# Patient Record
Sex: FEMALE | Race: WHITE
[De-identification: names, ages, dates, MRNs, and addresses within clinical notes are randomized per-mention and may not be internally consistent; named-entity substitution may affect disease eponyms.]

---

## 2018-08-30 ENCOUNTER — HOSPITAL ENCOUNTER (EMERGENCY)
Dept: HOSPITAL 97 - ER | Age: 53
Discharge: HOME | End: 2018-08-30
Payer: COMMERCIAL

## 2018-08-30 DIAGNOSIS — N93.9: Primary | ICD-10-CM

## 2018-08-30 DIAGNOSIS — I10: ICD-10-CM

## 2018-08-30 DIAGNOSIS — Z72.0: ICD-10-CM

## 2018-08-30 LAB
BUN BLD-MCNC: 19 MG/DL (ref 7–18)
GLUCOSE SERPLBLD-MCNC: 90 MG/DL (ref 74–106)
HCT VFR BLD CALC: 39.9 % (ref 36–45)
LYMPHOCYTES # SPEC AUTO: 3.1 K/UL (ref 0.7–4.9)
MCH RBC QN AUTO: 33.2 PG (ref 27–35)
MCV RBC: 95.9 FL (ref 80–100)
MORPHOLOGY BLD-IMP: (no result)
PMV BLD: 8.7 FL (ref 7.6–11.3)
POTASSIUM SERPL-SCNC: 4 MMOL/L (ref 3.5–5.1)
RBC # BLD: 4.16 M/UL (ref 3.86–4.86)

## 2018-08-30 PROCEDURE — 76830 TRANSVAGINAL US NON-OB: CPT

## 2018-08-30 PROCEDURE — 81003 URINALYSIS AUTO W/O SCOPE: CPT

## 2018-08-30 PROCEDURE — 86901 BLOOD TYPING SEROLOGIC RH(D): CPT

## 2018-08-30 PROCEDURE — 85025 COMPLETE CBC W/AUTO DIFF WBC: CPT

## 2018-08-30 PROCEDURE — 80048 BASIC METABOLIC PNL TOTAL CA: CPT

## 2018-08-30 PROCEDURE — 36415 COLL VENOUS BLD VENIPUNCTURE: CPT

## 2018-08-30 PROCEDURE — 99284 EMERGENCY DEPT VISIT MOD MDM: CPT

## 2018-08-30 PROCEDURE — 86900 BLOOD TYPING SEROLOGIC ABO: CPT

## 2018-08-30 NOTE — ER
Nurse's Notes                                                                                     

 [facility]                                                                

Name: Moriah Pan                                                                               

Age: 52 yrs                                                                                       

Sex: Female                                                                                       

: 1965                                                                                   

MRN: [id number]                                                                                   

Arrival Date: 2018                                                                          

Time: 05:36                                                                                       

Account#: [id number]                                                                            

Bed 5                                                                                             

Private MD: Agustín Slade                                                                         

Diagnosis: Abnormal uterine and vaginal bleeding, unspecified                                     

                                                                                                  

Presentation:                                                                                     

                                                                                             

05:42 Presenting complaint: Patient states: HEAVY VAGINAL BLEEDING FOR 24 HOURS. Transition   bp  

      of care: patient was not received from another setting of care. Onset of symptoms was       

      2018 at 05:42. Risk Assessment: Do you want to hurt yourself or someone          

      else? Patient reports no desire to harm self or others. Initial Sepsis Screen: Does the     

      patient meet any 2 criteria? No. Patient's initial sepsis screen is negative. Does the      

      patient have a suspected source of infection? No. Patient's initial sepsis screen is        

      negative. Care prior to arrival: None.                                                      

05:42 Method Of Arrival: Ambulatory                                                           bp  

05:42 Acuity: TEA 3                                                                           bp  

                                                                                                  

Triage Assessment:                                                                                

05:43 General: Appears in no apparent distress. comfortable, obese, Behavior is calm,         bp  

      cooperative, appropriate for age. Pain: Denies pain. EENT: No deficits noted. Neuro:        

      Level of Consciousness is awake, alert, obeys commands, Oriented to person, place,          

      time, situation, Appropriate for age. Cardiovascular: No deficits noted. Respiratory:       

      Airway is patent Respiratory effort is even, unlabored, Respiratory pattern is regular,     

      symmetrical. GI: No signs and/or symptoms were reported involving the gastrointestinal      

      system. : Reports vaginal bleeding that is heavy flow. Derm: No deficits noted.           

      Musculoskeletal: Circulation, motion, and sensation intact. Range of motion:.               

                                                                                                  

OB/GYN:                                                                                           

05:45 LMP N/A - Post-menopause                                                                bp  

06:17  4,  1, Living 3, LMP 2016                                             kb  

                                                                                                  

Historical:                                                                                       

- Allergies:                                                                                      

05:43 No Known Allergies;                                                                     bp  

- Home Meds:                                                                                      

05:43 None [Active];                                                                          bp  

- PMHx:                                                                                           

05:43 Hypertension; Hepatitis; resolved ; ITP;                                            bp  

                                                                                                  

- Immunization history:: Adult Immunizations up to date.                                          

- Social history:: Smoking status: Patient uses tobacco products, denies chronic                  

  smoking, but will smoke occasionally.                                                           

- Ebola Screening: : Patient negative for fever greater than or equal to 101.5 degrees            

  Fahrenheit, and additional compatible Ebola Virus Disease symptoms Patient denies               

  exposure to infectious person Patient denies travel to an Ebola-affected area in the            

  21 days before illness onset No symptoms or risks identified at this time.                      

                                                                                                  

                                                                                                  

Screenin:47 Abuse screen: Denies threats or abuse. Denies injuries from another. Nutritional        bp  

      screening: No deficits noted. Tuberculosis screening: No symptoms or risk factors           

      identified. Fall Risk None identified. No fall in past 12 months (0 pts). No secondary      

      diagnosis (0 pts). No IV (0 pts). Ambulatory Aid- None/Bed Rest/Nurse Assist (0 pts).       

      Gait- Normal/Bed Rest/Wheelchair (0 pts) Mental Status- Oriented to own ability (0          

      pts). Total Beal Fall Scale indicates No Risk (0-24 pts).                                  

                                                                                                  

Assessment:                                                                                       

05:47 General: SEE TRIAGE NOTE.                                                               bp  

06:45 Reassessment: U/S AT B/S.                                                               bp  

07:15 Reassessment: Patient appears in no apparent distress at this time. Patient and/or      cc3 

      family updated on plan of care and expected duration. Pain level reassessed. Patient is     

      alert, oriented x 3, equal unlabored respirations, skin warm/dry/pink. Patient denies       

      pain at this time.                                                                          

                                                                                                  

Vital Signs:                                                                                      

05:45  / 78; Pulse 66; Resp 14; Temp 98.4; Pulse Ox 97% ; Weight 87.54 kg; Height 5 ft. bp  

      2 in. (157.48 cm);                                                                          

06:00  / 72; Pulse 69; Resp 14; Pulse Ox 97% ;                                          bp  

06:15  / 77 Supine; Pulse 60;                                                           bp  

06:17  / 82 Sitting; Pulse 65;                                                          bp  

06:19  / 85 Standing; Pulse 66;                                                         bp  

07:15  / 67; Pulse 65; Resp 16; Temp 98.4; Pulse Ox 96% on R/A; Pain 0/10;              cc3 

08:20  / 78; Pulse 66; Resp 16; Pulse Ox 97% on R/A; Pain 0/10;                         hb  

05:45 Body Mass Index 35.30 (87.54 kg, 157.48 cm)                                             bp  

                                                                                                  

ED Course:                                                                                        

05:36 Patient arrived in ED.                                                                  al2 

05:37 Agustín Slade MD is Private Physician.                                                 al2 

05:41 Agustín Cain RN is Primary Nurse.                                                    bp  

05:43 Triage completed.                                                                       bp  

05:45 Arm band placed on right wrist.                                                         bp  

05:47 Patient has correct armband on for positive identification. Bed in low position. Call   bp  

      light in reach. Side rails up X 1. Side rails up X2.                                        

05:50 Nikita Wong MD is Attending Physician.                                            tw4 

06:03 [facility]                                                        kb  

06:03 Nikiat Wong MD is Attending Physician.                                            kb  

06:34 Inserted saline lock: 20 gauge in right antecubital area, using aseptic technique.      bp  

      Blood collected.                                                                            

07:00 Ultrasound completed.                                                                   aa4 

07:00 Transvaginal Study Probe In Process Unspecified.                                        EDMS

07:06 Report given to Tracey BOONE.                                                             jd3 

07:45 Manual Differential Sent.                                                               sv  

08:30 No provider procedures requiring assistance completed. IV discontinued, intact,         hb  

      bleeding controlled, No redness/swelling at site. Pressure dressing applied.                

                                                                                                  

Administered Medications:                                                                         

No medications were administered                                                                  

                                                                                                  

                                                                                                  

Outcome:                                                                                          

08:19 Discharge ordered by MD.                                                                kb  

08:30 Discharged to home ambulatory, with family.                                             hb  

08:30 Condition: stable                                                                           

08:30 Discharge instructions given to patient, family, Instructed on discharge instructions,      

      follow up and referral plans. Demonstrated understanding of instructions, follow-up         

      care.                                                                                       

08:34 Patient left the ED.                                                                    hb  

                                                                                                  

Signatures:                                                                                       

Dispatcher [employer]                           EDMS                                                 

Annia Francois FNP-C FNP-Ckb Verde, Stephanie, RN RN sv Frazier, Amanda                              aa4                                                  

Tracey Potter, Devin Paez RN, RN RN jd3 Peltier, Brian, RN RN bp Love, Angelica al2 Wadley, Terrence, MD MD   tw4                                                  

Justyna Peralta                             3                                                  

                                                                                                  

**************************************************************************************************

## 2018-08-30 NOTE — EDPHYS
Physician Documentation                                                                           

 [facility]                                                                

Name: Moriah Pan                                                                               

Age: 52 yrs                                                                                       

Sex: Female                                                                                       

: 1965                                                                                   

MRN: [id number]                                                                                   

Arrival Date: 2018                                                                          

Time: 05:36                                                                                       

Account#: [id number]                                                                            

Bed 5                                                                                             

Private MD: Agustín Slade                                                                         

ED Physician Nikita Wong                                                                     

HPI:                                                                                              

                                                                                             

06:17 This 52 yrs old  Female presents to ER via Ambulatory with complaints of       kb  

      Vaginal Bleeding.                                                                           

06:17 The patient presents with vaginal bleeding that is moderate, reports using 6 pads or    kb  

      tampons per day. Onset: The symptoms/episode began/occurred 24 hour(s) ago. Modifying       

      factors: The symptoms are alleviated by nothing, the symptoms are aggravated by             

      nothing. Associated signs and symptoms: Pertinent positives: vaginal bleeding,              

      Pertinent negatives: cramping, fever. Severity of symptoms: At their worst the symptoms     

      were moderate, in the emergency department the symptoms are unchanged. The patient has      

      not experienced similar symptoms in the past. The patient has not recently seen a           

      physician. Pt states she hasn't had a period since 2016 and started having vaginal     

      bleeding 24 hours ago. Has used 6 tampons since onset. Denies abd pain, cramping.           

                                                                                                  

OB/GYN:                                                                                           

05:45 LMP N/A - Post-menopause                                                                bp  

06:17  4,  1, Living 3, LMP 2016                                             kb  

                                                                                                  

Historical:                                                                                       

- Allergies:                                                                                      

05:43 No Known Allergies;                                                                     bp  

- Home Meds:                                                                                      

05:43 None [Active];                                                                          bp  

- PMHx:                                                                                           

05:43 Hypertension; Hepatitis; resolved ; ITP;                                            bp  

                                                                                                  

- Immunization history:: Adult Immunizations up to date.                                          

- Social history:: Smoking status: Patient uses tobacco products, denies chronic                  

  smoking, but will smoke occasionally.                                                           

- Ebola Screening: : Patient negative for fever greater than or equal to 101.5 degrees            

  Fahrenheit, and additional compatible Ebola Virus Disease symptoms Patient denies               

  exposure to infectious person Patient denies travel to an Ebola-affected area in the            

  21 days before illness onset No symptoms or risks identified at this time.                      

                                                                                                  

                                                                                                  

ROS:                                                                                              

06:17 Constitutional: Negative for fever, chills, and weight loss, Cardiovascular: Negative   kb  

      for chest pain, palpitations, and edema, Respiratory: Negative for shortness of breath,     

      cough, wheezing, and pleuritic chest pain, Abdomen/GI: Negative for abdominal pain,         

      nausea, vomiting, diarrhea, and constipation, MS/Extremity: Negative for injury and         

      deformity, Skin: Negative for injury, rash, and discoloration, Neuro: Negative for          

      headache, weakness, numbness, tingling, and seizure.                                        

06:17 : Positive for vaginal bleeding.                                                          

                                                                                                  

Exam:                                                                                             

06:17 Constitutional:  This is a well developed, well nourished patient who is awake, alert,  kb  

      and in no acute distress. Head/Face:  Normocephalic, atraumatic. Chest/axilla:  Normal      

      chest wall appearance and motion.  Nontender with no deformity.  No lesions are             

      appreciated. Cardiovascular:  Regular rate and rhythm with a normal S1 and S2.  No          

      gallops, murmurs, or rubs.  Normal PMI, no JVD.  No pulse deficits. Respiratory:  Lungs     

      have equal breath sounds bilaterally, clear to auscultation and percussion.  No rales,      

      rhonchi or wheezes noted.  No increased work of breathing, no retractions or nasal          

      flaring. Abdomen/GI:  Soft, non-tender, with normal bowel sounds.  No distension or         

      tympany.  No guarding or rebound.  No evidence of tenderness throughout. Back:  No          

      spinal tenderness.  No costovertebral tenderness.  Full range of motion. Skin:  Warm,       

      dry with normal turgor.  Normal color with no rashes, no lesions, and no evidence of        

      cellulitis. MS/ Extremity:  Pulses equal, no cyanosis.  Neurovascular intact.  Full,        

      normal range of motion. Neuro:  Awake and alert, GCS 15, oriented to person, place,         

      time, and situation.  Cranial nerves II-XII grossly intact.  Motor strength 5/5 in all      

      extremities.  Sensory grossly intact.  Cerebellar exam normal.  Normal gait.                

                                                                                                  

Vital Signs:                                                                                      

05:45  / 78; Pulse 66; Resp 14; Temp 98.4; Pulse Ox 97% ; Weight 87.54 kg; Height 5 ft. bp  

      2 in. (157.48 cm);                                                                          

06:00  / 72; Pulse 69; Resp 14; Pulse Ox 97% ;                                          bp  

06:15  / 77 Supine; Pulse 60;                                                           bp  

06:17  / 82 Sitting; Pulse 65;                                                          bp  

06:19  / 85 Standing; Pulse 66;                                                         bp  

07:15  / 67; Pulse 65; Resp 16; Temp 98.4; Pulse Ox 96% on R/A; Pain 0/10;              cc3 

08:20  / 78; Pulse 66; Resp 16; Pulse Ox 97% on R/A; Pain 0/10;                         hb  

05:45 Body Mass Index 35.30 (87.54 kg, 157.48 cm)                                             bp  

                                                                                                  

MDM:                                                                                              

05:50 Patient medically screened.                                                             tw4 

06:17 Data reviewed: vital signs, nurses notes. Data interpreted: Pulse oximetry: on room air kb  

      is 97 %. Interpretation: normal.                                                            

08:19 Counseling: I had a detailed discussion with the patient and/or guardian regarding: the kb  

      historical points, exam findings, and any diagnostic results supporting the                 

      discharge/admit diagnosis, lab results, radiology results, the need for outpatient          

      follow up, an OB/Gyne specialist, to return to the emergency department if symptoms         

      worsen or persist or if there are any questions or concerns that arise at home.             

                                                                                                  

                                                                                             

06:05 Order name: Abo/rh Typing                                                               kb  

                                                                                             

06:05 Order name: Basic Metabolic Panel; Complete Time: 06:57                                 kb  

                                                                                             

06:05 Order name: CBC with Diff                                                               kb  

                                                                                             

06:43 Order name: Urine Dipstick--Ancillary (enter results)                                   eb  

                                                                                             

06:48 Order name: Manual Differential                                                         EDMS

                                                                                             

06:05 Order name: IV Saline Lock; Complete Time: 06:33                                        kb  

                                                                                             

06:05 Order name: Labs collected and sent; Complete Time: 06:33                               kb  

                                                                                             

06:05 Order name: NPO; Complete Time: 06:33                                                   kb  

                                                                                             

06:05 Order name: Urine Dipstick-Ancillary (obtain specimen); Complete Time: 06:33            kb  

                                                                                             

06:05 Order name: Orthostatics; Complete Time: 06:33                                          kb  

                                                                                             

06:41 Order name: Transvaginal Study Probe                                                    EDMS

                                                                                                  

Administered Medications:                                                                         

No medications were administered                                                                  

                                                                                                  

                                                                                                  

Disposition:                                                                                      

18 08:19 Discharged to Home. Impression: Abnormal uterine and vaginal bleeding,             

  unspecified.                                                                                    

- Condition is Stable.                                                                            

- Discharge Instructions: Abnormal Uterine Bleeding, Easy-to-Read.                                

                                                                                                  

- Medication Reconciliation Form, Thank You Letter, Antibiotic Education, Prescription            

  Opioid Use form.                                                                                

- Follow up: Emergency Department; When: As needed; Reason: Worsening of condition.               

  Follow up: Private Physician; When: 2 - 3 days; Reason: Recheck today's complaints,             

  Continuance of care, Re-evaluation by your physician.                                           

                                                                                                  

                                                                                                  

                                                                                                  

Addendum:                                                                                         

2018                                                                                        

     05:12 Co-signature as Attending Physician, Nikita Wong MD I agree with the assessment and t
w4

           plan of care. Attestation: The patient's history, exam findings, diagnostics, and a    

           summary of any interventions or procedures was reviewed in detail with Annia ZULETA.                                                                                 

                                                                                                  

Signatures:                                                                                       

Dispatcher [employer]                           [facility]                                                 

Annia Francois FNP-C FNP-CkTracey Fuentes, RN                     RN   Agustín Quintero, RN                      RN   bp                                                   

Nikita Wong MD MD   tw4                                                  

                                                                                                  

Corrections: (The following items were deleted from the chart)                                    

                                                                                             

06:41 06:30 Transvaginal Ob+US.RAD.BRZ ordered. [facility]

08:34 08:19 2018 08:19 Discharged to Home. Impression: Abnormal uterine and vaginal     hb  

      bleeding, unspecified. Condition is Stable. Forms are Medication Reconciliation Form,       

      Thank You Letter, Antibiotic Education, Prescription Opioid Use. Follow up: Emergency       

      Department; When: As needed; Reason: Worsening of condition. Follow up: Private             

      Physician; When: 2 - 3 days; Reason: Recheck today's complaints, Continuance of care,       

      Re-evaluation by your physician. kb                                                         

                                                                                                  

**************************************************************************************************

## 2018-08-30 NOTE — RAD REPORT
EXAM DESCRIPTION:  US - Transvaginal Study Probe - 8/30/2018 8:11 am

 

CLINICAL HISTORY:  Postmenopausal bleeding

 

COMPARISON:  None

 

FINDINGS:  The uterus measures 7 x 5 x 5cm. The uterus is retroverted.

 

Endometrial stripe measures 15 millimeters.

 

A fibroid is not seen.

 

No free fluid is seen

 

IMPRESSION:  Thickened endometrial stripe may be secondary to endometrial carcinoma, polyp or hyperpl
krishna

## 2018-11-01 ENCOUNTER — HOSPITAL ENCOUNTER (OUTPATIENT)
Dept: HOSPITAL 97 - OR | Age: 53
Discharge: HOME | End: 2018-11-01
Attending: OBSTETRICS & GYNECOLOGY
Payer: COMMERCIAL

## 2018-11-01 DIAGNOSIS — N88.2: ICD-10-CM

## 2018-11-01 DIAGNOSIS — Z80.51: ICD-10-CM

## 2018-11-01 DIAGNOSIS — Z80.52: ICD-10-CM

## 2018-11-01 DIAGNOSIS — Z86.19: ICD-10-CM

## 2018-11-01 DIAGNOSIS — N95.0: Primary | ICD-10-CM

## 2018-11-01 PROCEDURE — 0UDB8ZX EXTRACTION OF ENDOMETRIUM, VIA NATURAL OR ARTIFICIAL OPENING ENDOSCOPIC, DIAGNOSTIC: ICD-10-PCS

## 2018-11-01 PROCEDURE — 88305 TISSUE EXAM BY PATHOLOGIST: CPT

## 2018-11-01 NOTE — OP
Date of Procedure:  11/01/2018



Surgeon:  Rachel Padgett MD



Assistant:  None.



Preoperative Diagnosis:  Postmenopausal bleeding.  cervical stenosis.



Postoperative Diagnosis:  Cervical stenosis.



Procedures Performed:  Hysteroscopy, dilation and curettage with Symphion.  The patient with cervical
 stenosis.



Specimens:  Endometrial curettings.



Complications:  None.



Drains:  None.



Findings:  The cervix had to be dilated in order for me to introduce a Symphion scope.  The cavity wa
s empty.  No intracavitary lesions.  Both tubal ostia were visualized.  The cavity was undistorted.  
Optimal curettings were obtained.



Condition:  The patient is stable.



Indications For Procedure:  The patient is a 53-year-old with postmenopausal bleeding.  Hysteroscopy 
was performed in the office and attempted to do curettings.  There was difficulty dilating her cervix
 since she was unable to tolerate the pain so we stopped at that point and brought to the hospital fo
r the procedure under anesthesia.



Description Of Procedure:  After informed consent was verified, the patient was taken back to the OR,
 placed in a supine fashion on the operating table.  After general anesthesia was given, she was plac
ed in a dorsal lithotomy position.  Pelvic exam was performed.  Uterus was found to be anteflexed.  N
o adnexal masses. 



After vaginal speculum was placed, prep x3 with Betadine was done.  Anterior lip grasped with 2 Allis
 clamps.  Cervix was dilated to 18-Occitan.  Then, the Symphion scope was primed and inserted through 
the cervical canal under direct vision into the uterine cavity.  The cavity was empty.  Then, the res
ection device was taken and used to resect the endometrium in a global fashion.  There was adequate s
ampling.  The pressure set was at 100 mmHg.  EBL was minimal.  All the instruments were removed after
 the procedure was completed.  The fluid deficit was minimal.  Normal saline was used for distention 
medium. 



After the patient was recovered from anesthesia in the OR, she was taken to PACU in stable condition.
  She will follow up with me in 1 week for results.





SK/JOHNNY

DD:  11/01/2018 07:49:30Voice ID:  [id number]

DT:  11/01/2018 12:20:47Report ID:  [id number]

## 2020-10-07 ENCOUNTER — HOSPITAL ENCOUNTER (EMERGENCY)
Dept: HOSPITAL 97 - ER | Age: 55
Discharge: HOME | End: 2020-10-07
Payer: COMMERCIAL

## 2020-10-07 VITALS — OXYGEN SATURATION: 98 % | DIASTOLIC BLOOD PRESSURE: 71 MMHG | SYSTOLIC BLOOD PRESSURE: 131 MMHG

## 2020-10-07 VITALS — TEMPERATURE: 97.2 F
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
## 2020-10-07 DIAGNOSIS — I10: ICD-10-CM
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
## 2020-10-07 DIAGNOSIS — S82.142A: Primary | ICD-10-CM

## 2020-10-07 PROCEDURE — 99283 EMERGENCY DEPT VISIT LOW MDM: CPT

## 2020-10-07 PROCEDURE — 73700 CT LOWER EXTREMITY W/O DYE: CPT

## 2020-10-07 NOTE — RAD REPORT
EXAM DESCRIPTION:  CT - Knee Left Wo Con - 10/7/2020 8:23 pm

 

CLINICAL HISTORY:  knee injury

Trauma, knee pain injury

 

COMPARISON:  No comparisons

 

FINDINGS:  Mildly depressed tibial plateau fracture is present measuring 18 mm in transverse dimensio
n. Articular surface of the lateral tibial plateau is depressed approximately 5 mm. A moderate lipohe
marthrosis is present. No additional fracture evident.

 

IMPRESSION:  Mildly depressed lateral tibial plateau fracture is noted.

 

 All CT scans are performed using dose optimization technique as appropriate and may include

automated exposure control or mA/KV adjustment according to patient size.

## 2020-10-07 NOTE — RAD REPORT
EXAM DESCRIPTION:  RAD - Knee Left 3 View - 10/7/2020 6:57 pm

 

CLINICAL HISTORY:  swing struck lateral side of knee;Pain

Pain and swelling

 

COMPARISON:  No comparisons

 

FINDINGS:  Intraarticular fracture of the lateral aspect of the tibial plateau is suspected with mode
rate lipohemarthrosis.

## 2020-10-07 NOTE — ER
Nurse's Notes                                                                                     

 [facility]                                                                 

Name: Moriah Pan                                                                               

Age: 55 yrs                                                                                       

Sex: Female                                                                                       

: 1965                                                                                   

MRN: [id number]                                                                                   

Arrival Date: 10/07/2020                                                                          

Time: 17:56                                                                                       

Account#: [id number]                                                                            

Bed 13                                                                                            

Private MD: Agustín Slade                                                                         

Diagnosis: Left Knee Lateral Tibial Plateau Fracture;Possible Left Quadriceps Tendon Rupture      

                                                                                                  

Presentation:                                                                                     

10/07                                                                                             

18:11 Chief complaint: Patient states: 40-inch swing with 2 boys on it hit side of my L knee  ca1 

      and I felt like it moved sideways. I can't move it and I was in a lot of pain. Took 4       

      Tylenol with relief. Coronavirus screen: Client denies travel out of the [place] in the        

      last 14 days. At this time, the client does not indicate any symptoms associated with       

      coronavirus-19. Ebola Screen: Patient negative for fever greater than or equal to 101.5     

      degrees Fahrenheit, and additional compatible Ebola Virus Disease symptoms Patient          

      denies exposure to infectious person. Patient denies travel to an Ebola-affected area       

      in the 21 days before illness onset. No symptoms or risks identified at this time.          

      Initial Sepsis Screen: Does the patient meet any 2 criteria? No. Patient's initial          

      sepsis screen is negative. Does the patient have a suspected source of infection? No.       

      Patient's initial sepsis screen is negative. Risk Assessment: Do you want to hurt           

      yourself or someone else? Patient reports no desire to harm self or others. Onset of        

      symptoms was 2020.                                                              

18:11 Method Of Arrival: Wheelchair                                                           ca1 

18:11 Acuity: TEA 4                                                                           ca1 

                                                                                                  

OB/GYN:                                                                                           

18:14 LMP N/A - Post-menopause                                                                ca1 

                                                                                                  

Historical:                                                                                       

- Allergies:                                                                                      

18:14 No Known Allergies;                                                                     ca1 

- PMHx:                                                                                           

18:16 Hepatitis; resolved ; Hypertension; ITP;                                            ca1 

                                                                                                  

- Immunization history:: Adult Immunizations up to date.                                          

- Social history:: Smoking status: Patient denies any tobacco usage or history of.                

                                                                                                  

                                                                                                  

Screenin:13 Abuse screen: Denies threats or abuse. Nutritional screening: No deficits noted.        tw2 

      Tuberculosis screening: No symptoms or risk factors identified. Fall Risk None              

      identified.                                                                                 

                                                                                                  

Assessment:                                                                                       

18:16 General: Appears in no apparent distress. uncomfortable, obese, well groomed, Behavior  tw2 

      is calm, cooperative, appropriate for age. Pain: Complains of pain in left knee. Neuro:     

      Level of Consciousness is awake, alert, obeys commands, Oriented to person, place,          

      time, situation. Cardiovascular: Patient's skin is warm and dry. Respiratory: Airway is     

      patent Respiratory effort is even, unlabored, Respiratory pattern is regular,               

      symmetrical. GI: No signs and/or symptoms were reported involving the gastrointestinal      

      system. : No signs and/or symptoms were reported regarding the genitourinary system.      

      EENT: No signs and/or symptoms were reported regarding the EENT system. Derm: No signs      

      and/or symptoms reported regarding the dermatologic system. Musculoskeletal:                

      Circulation, motion, and sensation intact. Capillary refill < 3 seconds, Range of           

      motion: limited in left knee pt states "i cannot put any pressure or any weight down on     

      my left leg" Reports pain in left knee.                                                     

18:22 Reassessment: provider at bedside at this time.                                         tw2 

19:13 Reassessment: Patient appears in no apparent distress at this time. No changes from     tw2 

      previously documented assessment. Patient and/or family updated on plan of care and         

      expected duration. Pain level reassessed. Patient is alert, oriented x 3, equal             

      unlabored respirations, skin warm/dry/pink.                                                 

19:35 Reassessment: pt advised dr ordered crutches, pt denied stating she already had some in ll2 

      her car and didn't need a new set.                                                          

                                                                                                  

Vital Signs:                                                                                      

18:11  / 71; Pulse 61; Resp 18 S; Temp 97.2(TE); Pulse Ox 99% on R/A; Weight 90.72 kg   ca1 

      (R); Height 5 ft. 1 in. (154.94 cm) (R); Pain 5/10;                                         

19:13  / 76; Pulse 58; Resp 17; Pulse Ox 99% on R/A;                                    tw2 

20:20  / 71; Pulse 55; Resp 14; Pulse Ox 98% on R/A;                                    ll2 

18:11 Body Mass Index 37.79 (90.72 kg, 154.94 cm)                                             ca1 

                                                                                                  

ED Course:                                                                                        

17:56 Patient arrived in ED.                                                                  ag5 

17:56 Agustín Slade MD is Private Physician.                                                 ag5 

18:06 Harish Osborn PA is PHCP.                                                                cp  

18:06 Jimi Everett MD is Attending Physician.                                                cp  

18:12 Hernanedz, Anny, RN is Primary Nurse.                                                        tw2 

18:13 Triage completed.                                                                       ca1 

18:13 Bed in low position. Call light in reach.                                               tw2 

18:14 Arm band placed on right wrist.                                                         ca1 

18:57 XRAY Knee LEFT 3 view In Process Unspecified.                                           EDMS

19:26 Stef Ortega MD is Referral Physician.                                            cp  

20:23 Knee Left Wo Con In Process Unspecified.                                                EDMS

20:49 Stef Ortega MD is Referral Physician.                                            cp  

                                                                                                  

Administered Medications:                                                                         

18:35 Drug: Ibuprofen 800 mg Route: PO;                                                       tw2 

18:35 Drug: Hydrocodone-Acetaminophen (7.5 mg-325 mg) 1 tabs Route: PO;                       tw2 

                                                                                                  

                                                                                                  

Outcome:                                                                                          

19:27 Discharge ordered by MD.                                                                cp  

20:51 Discharge ordered by MD.                                                                cp  

21:08 Patient left the ED.                                                                    mw2 

                                                                                                  

Signatures:                                                                                       

Dispatcher [employer]                           EDMS                                                 

Harish Osborn PA PA   cp                                                   

Anny Hernandez RN                          RN   tw2                                                  

Celeste Haro                            mw2                                                  

Molly Hinojosa RN                        RN   ca1                                                  

Naheed Cali                                5                                                  

Codi Oates RN                    RN   ll2                                                  

                                                                                                  

**************************************************************************************************

## 2020-10-07 NOTE — EDPHYS
Physician Documentation                                                                           

 [facility]                                                                 

Name: Moriah Pan                                                                               

Age: 55 yrs                                                                                       

Sex: Female                                                                                       

: 1965                                                                                   

MRN: [id number]                                                                                   

Arrival Date: 10/07/2020                                                                          

Time: 17:56                                                                                       

Account#: [id number]                                                                            

Bed 13                                                                                            

Private MD: Agustín Slade ED Physician Jimi Everett                                                                         

HPI:                                                                                              

10/07                                                                                             

18:30 This 55 yrs old  Female presents to ER via Wheelchair with complaints of Knee  cp  

      Injury, Knee Pain.                                                                          

18:30 The patient presents with decreased range of motion, an injury, pain, that is acute.    cp  

      The complaints affect the lateral aspect of left knee and left knee. Context: The           

      problem was sustained at home, resulted from a direct blow, "swing" struck lateral side     

      of knee, the patient is not able to bear weight, must have assistance, from family,         

      Problem is a result from a previous injury: No.                                             

18:30 Onset: The symptoms/episode began/occurred today.                                       cp  

                                                                                                  

OB/GYN:                                                                                           

18:14 LMP N/A - Post-menopause                                                                ca1 

                                                                                                  

Historical:                                                                                       

- Allergies:                                                                                      

18:14 No Known Allergies;                                                                     ca1 

- PMHx:                                                                                           

18:16 Hepatitis; resolved ; Hypertension; ITP;                                            ca1 

                                                                                                  

- Immunization history:: Adult Immunizations up to date.                                          

- Social history:: Smoking status: Patient denies any tobacco usage or history of.                

                                                                                                  

                                                                                                  

ROS:                                                                                              

18:30 Constitutional: Negative for body aches, chills, fever.                                 cp  

18:30 Neck: Negative for pain with movement, pain at rest, stiffness.                             

18:30 Cardiovascular: Negative for chest pain, palpitations.                                      

18:30 Respiratory: Negative for cough, shortness of breath, wheezing.                             

18:30 Abdomen/GI: Negative for abdominal pain, nausea, vomiting, and diarrhea.                    

18:30 Back: Negative for pain at rest, pain with movement, radiated pain.                         

18:30 MS/extremity: Positive for injury or acute deformity, decreased range of motion, pain,      

      swelling, tenderness, of the left knee, Negative for paresthesias.                          

18:30 Neuro: Negative for altered mental status, loss of consciousness, syncope, weakness.        

18:30 All other systems are negative.                                                             

                                                                                                  

Exam:                                                                                             

18:37 Constitutional: The patient appears in no acute distress, alert, awake, non-toxic, well cp  

      developed, well nourished, obese.                                                           

18:37 Head/Face:  Normocephalic, atraumatic.                                                  cp  

18:37 Eyes: Periorbital structures: appear normal, Conjunctiva: normal, no exudate, no            

      injection, Lids and lashes: appear normal, bilaterally.                                     

18:37 ENT: External ear(s): are unremarkable, Nose: is normal, Posterior pharynx: Airway: no      

      evidence of obstruction, patent.                                                            

18:37 Neck: ROM/movement: is normal, is supple, without pain, no range of motions limitations.    

18:37 Chest/axilla: Inspection: normal.                                                           

18:37 Cardiovascular: Rate: normal, Edema: is not appreciated, JVD: is not appreciated.           

18:37 Respiratory: the patient does not display signs of respiratory distress,  Respirations:     

      normal, no use of accessory muscles, labored breathing, is not present.                     

18:37 Abdomen/GI: Inspection: abdomen appears normal.                                             

18:37 Back: pain, is absent, ROM is normal.                                                       

18:37 Musculoskeletal/extremity: ROM: limited passive range of motion, in the left knee,          

      limited passive range of motion due to pain, in the left knee, Perfusion: the extremity     

      is normally perfused throughout, Severe pain noted. Joints: All joints are normal           

      except the  left knee displays limited range of motion, pain at rest, painful range of      

      motion, swelling, tenderness to palpation noted anterior knee above patella, Weight         

      bearing: is unable to bear weight, Tendon exam: postive for pain to palpation of            

      quadriceps tendon.                                                                          

                                                                                                  

Vital Signs:                                                                                      

18:11  / 71; Pulse 61; Resp 18 S; Temp 97.2(TE); Pulse Ox 99% on R/A; Weight 90.72 kg   ca1 

      (R); Height 5 ft. 1 in. (154.94 cm) (R); Pain 5/10;                                         

19:13  / 76; Pulse 58; Resp 17; Pulse Ox 99% on R/A;                                    tw2 

20:20  / 71; Pulse 55; Resp 14; Pulse Ox 98% on R/A;                                    ll2 

18:11 Body Mass Index 37.79 (90.72 kg, 154.94 cm)                                             ca1 

                                                                                                  

MDM:                                                                                              

18:20 Patient medically screened.                                                             cp  

19:00 Differential diagnosis: dislocation, closed fracture, contusion, tendon rupture,        cp  

      ligament rupture.                                                                           

19:10 Test interpretation: by ED physician or midlevel provider: xrays of left knee negative  cp  

      for fracture.                                                                               

20:45 Physician consultation: Stef Ortega MD was called at 20:45, was contacted at         

      20:45, regarding consult, patient's condition, outpatient follow-up, in 2-3 days, and       

      will see patient in office.                                                                 

20:50 Data reviewed: vital signs, nurses notes, radiologic studies, CT scan, plain films, I   cp  

      have discussed the patient's presentation/case with the attending Emergency Department      

      Physician;.                                                                                 

20:50 Response to treatment: the patient's symptoms have markedly improved after treatment,   cp  

      and as a result, I will discharge patient.                                                  

                                                                                                  

10/07                                                                                             

18:25 Order name: XRAY Knee LEFT 3 view; Complete Time: 19:41                                   

10/07                                                                                             

19:11 Order name: Knee Immobilizer; Complete Time: 19:42                                        

10/07                                                                                             

20:14 Order name: Knee Left Wo Con; Complete Time: 20:36                                      EDMS

10/07                                                                                             

20:37 Interpretation: Report reviewed.                                                          

                                                                                                  

Administered Medications:                                                                         

18:35 Drug: Ibuprofen 800 mg Route: PO;                                                       tw2 

18:35 Drug: Hydrocodone-Acetaminophen (7.5 mg-325 mg) 1 tabs Route: PO;                       tw2 

                                                                                                  

                                                                                                  

Disposition:                                                                                      

21:00 Chart complete.                                                                           

10/08                                                                                             

07:14 Co-signature as Attending Physician, Jimi Everett MD.                                    rn  

                                                                                                  

Disposition:                                                                                      

10/07/20 20:51 Discharged to Home. Impression: Left Knee Lateral Tibial Plateau Fracture,         

  Possible Left Quadriceps Tendon Rupture.                                                        

- Condition is Stable.                                                                            

- Discharge Instructions: Knee Immobilizer, Knee Fracture, Adult.                                 

- Prescriptions for Ibuprofen 800 mg Oral Tablet - take 1 tablet by ORAL route every 8            

  hours As needed take with food; 30 tablet. Tylenol- Codeine #3 300-30 mg Oral Tablet            

  - take 2 tablets by ORAL route every 6 hours As needed; 20 tablet.                              

- Medication Reconciliation Form, Thank You Letter, Antibiotic Education, Prescription            

  Opioid Use form.                                                                                

- Follow up: Stef Ortega MD; When: 1 - 2 days; Reason: Recheck today's                     

  complaints.                                                                                     

- Problem is new.                                                                                 

- Symptoms have improved.                                                                         

                                                                                                  

                                                                                                  

                                                                                                  

Signatures:                                                                                       

Dispatcher [employer]                           EDMS                                                 

Jimi Everett MD MD   rn                                                   

Harish Osborn PA PA                                                      

Anny Hernandez RN                          RN   tw2                                                  

Celeste Haro                            2                                                  

Molly Hinojosa RN                        RN   ca1                                                  

                                                                                                  

Corrections: (The following items were deleted from the chart)                                    

10/07                                                                                             

19:44 19:27 10/07/2020 19:27 Discharged to Home. Impression: Contusion of left knee - with    cp  

      possible quadriceps tendon rupture. Condition is Stable. Forms are Medication               

      Reconciliation Form, Thank You Letter, Antibiotic Education, Prescription Opioid Use.       

      Follow up: Stef Ortega; When: 1 - 2 days; Reason: Recheck today's complaints.          

      Problem is new. Symptoms have improved. cp                                                  

20:14 19:48 CT LEFT KNEE WO CONTRAST ordered. EDMS                                            EDMS

21:08 20:51 10/07/2020 20:51 Discharged to Home. Impression: Left Knee Lateral Tibial Plateau mw2 

      Fracture; Possible Left Quadriceps Tendon Rupture. Condition is Stable. Prescriptions       

      for Ibuprofen 800 mg Oral Tablet - take 1 tablet by ORAL route every 8 hours As needed      

      take with food; 30 tablet, Tylenol-Codeine #3 300-30 mg Oral Tablet - take 2 tablets by     

      ORAL route every 6 hours As needed; 20 tablet. and Forms are Medication Reconciliation      

      Form, Thank You Letter, Antibiotic Education, Prescription Opioid Use. Follow up:           

      Stef Ortega; When: 1 - 2 days; Reason: Recheck today's complaints. Problem is new.     

      Symptoms have improved. cp                                                                  

                                                                                                  

**************************************************************************************************

## 2020-10-08 NOTE — XMS REPORT
Continuity of Care Document

                           Created on:2020



Patient:CAS ROMERO

Sex:Female

:1965

External Reference #:[id number]





Demographics







                          Address                   [address] [place] [id number]

 

                          Home Phone                [phone]

 

                          Work Phone                [phone]

 

                          Email Address             [email or web address]

 

                          Preferred Language        Unknown

 

                          Marital Status            Unknown

 

                          Alevism Affiliation     Unknown

 

                          Race                      Unknown

 

                          Additional Race(s)        Unavailable

 

                          Ethnic Group              Unknown









Author







                          Organization              [facility]

t

 

                          Address                   [address] [place] [address] [address]. [address] [place] [id number]

 

                          Phone                     [phone]









Care Team Providers







                    Name                Role                Phone

 

                    Unavailable         Unavailable         Unavailable









Problems

This patient has no known problems.



Allergies, Adverse Reactions, Alerts

This patient has no known allergies or adverse reactions.



Medications

This patient has no known medications.



Procedures

This patient has no known procedures.



Results

This patient has no known results.

## 2020-12-01 ENCOUNTER — HOSPITAL ENCOUNTER (EMERGENCY)
Dept: HOSPITAL 97 - ER | Age: 55
Discharge: HOME | End: 2020-12-01
Payer: COMMERCIAL

## 2020-12-01 DIAGNOSIS — I10: ICD-10-CM

## 2020-12-01 DIAGNOSIS — R10.13: Primary | ICD-10-CM

## 2020-12-01 LAB
ALBUMIN SERPL BCP-MCNC: 3.4 G/DL (ref 3.4–5)
ALP SERPL-CCNC: 85 U/L (ref 45–117)
ALT SERPL W P-5'-P-CCNC: 26 U/L (ref 12–78)
AST SERPL W P-5'-P-CCNC: 28 U/L (ref 15–37)
BUN BLD-MCNC: 13 MG/DL (ref 7–18)
GLUCOSE SERPLBLD-MCNC: 106 MG/DL (ref 74–106)
HCT VFR BLD CALC: 40.8 % (ref 36–45)
LIPASE SERPL-CCNC: 106 U/L (ref 73–393)
LYMPHOCYTES # SPEC AUTO: 3.9 K/UL (ref 0.7–4.9)
PMV BLD: 8.9 FL (ref 7.6–11.3)
POTASSIUM SERPL-SCNC: 4.3 MMOL/L (ref 3.5–5.1)
RBC # BLD: 4.28 M/UL (ref 3.86–4.86)

## 2020-12-01 PROCEDURE — 82565 ASSAY OF CREATININE: CPT

## 2020-12-01 PROCEDURE — 85025 COMPLETE CBC W/AUTO DIFF WBC: CPT

## 2020-12-01 PROCEDURE — 36415 COLL VENOUS BLD VENIPUNCTURE: CPT

## 2020-12-01 PROCEDURE — 93005 ELECTROCARDIOGRAM TRACING: CPT

## 2020-12-01 PROCEDURE — 83690 ASSAY OF LIPASE: CPT

## 2020-12-01 PROCEDURE — 80053 COMPREHEN METABOLIC PANEL: CPT

## 2020-12-01 PROCEDURE — 74177 CT ABD & PELVIS W/CONTRAST: CPT

## 2020-12-01 PROCEDURE — 99283 EMERGENCY DEPT VISIT LOW MDM: CPT

## 2020-12-01 NOTE — XMS REPORT
Summary of Care

                           Created on:2020



Patient:Moriah Pan

Sex:Female

:1965

External Reference #:[id number]





Demographics







                          Address                   [address] [place] [address]

 

                          Mobile Phone              [phone]

 

                          Home Phone                [phone]

 

                          Work Phone                [phone]

 

                          Preferred Language        English

 

                          Marital Status            

 

                          Presybeterian Affiliation     Unknown

 

                          Race                      White

 

                          Ethnic Group              Not  or 









Author







                          Organization              [facility]

 

                          Address                   [address] [place] [address]









Support







                Name            Relationship    Address         Phone

 

                Asa Pan Unavailable     [address] [facility]  [phone]

[phone]



                                                [place] [address] 









Care Team Providers







                    Name                Role                Phone

 

                    Pcp,  Does Not Have A Primary Care Provider [phone]









Reason for Visit







                          Reason                    Comments

 

                          LAB WORK                  



Auth/Cert





           Status     Reason     Specialty  Diagnoses / Referred By Referred To



                                            Procedures Contact    Contact

 

                                                Clinical Medical Diagnoses



Displaced bicondylar fracture of left tibia, initial encounter for closed 
fracture



Displaced bicondylar fracture of left tibia, initial encounter for closed 
fracture [S82.142A]                                 [facility]







                                                Laboratory      



Procedures



COVID-19 (ID NOW RAPID TESTING)



COVID                                               [address] [place], [place]



                                                                  [address]







                                                                  Phone:



                                                                  [phone]







                                                                  Fax:



                                                                  [phone]









Encounter Details







             Date         Type         Department   Care Team    Description

 

                10/13/2020      Technician Visit [facility]     Manuel Ortega MD



[address] [facility] [address]



[place], [place] [address] [phone] (Fax)                      Pre-operative



                                                Professional Office 



Pob, [facility] Main                       clearance (Primary



                                       Building Phlebotomy              Dx)



                                                            Lab



                                                    



                                       Professional Office              



                                                            Building



                                                    



                                       [address] [facility] ,              



                                                            [address]



                                                    



                                       [place]              



                                                            [address] [phone]              







Allergies

No Known Allergiesdocumented as of this encounter (statuses as of 10/13/2020)



Medications







          Medication Sig       Dispensed Refills   Start Date End Date  Status

 

          bisoprolol-hydrochlorothi Take 1 Tab by           0                   

          Active



          azide (ZIAC) 10-6.25 mg mouth daily.                                  

       



          per tablet                                                   

 

          lisdexamfetamine Take 40 mg by           0                            

 Active



          (VYVANSE) 40 mg capsule mouth every                                   

      



                    morning.                                          

 

          DULoxetine (CYMBALTA) 60 Take 60 mg by           0                    

         Active



          mg capsule mouth daily.                                         

 

          ibuprofen 800 mg tablet Take 1 tablet 15 tablet 0         2018  

         Active



                    by mouth 3                                         



                    (three) times                                         



                    daily with                                         



                    meals.                                            

 

          methocarbamol 750 mg Take 1 tablet 20 tablet 0         2018     

      Active



          tablet    by mouth 4                                         



                    (four) times                                         



                    daily.                                            

 

          traMADOL 50 mg tablet Take 1 tablet 20 tablet 0         2018    

       Active



                    by mouth every                                         



                    6 (six) hours                                         



                    as needed for                                         



                    Pain (scale                                         



                    1-3).                                             



documented as of this encounter (statuses as of 10/13/2020)



Active Problems







                          Problem                   Noted Date

 

                          Encounter for routine gynecological examination 2013









                                        Overview: 







                                        ICD10 Diagnosis Term Replacer Utility









                          Attention deficit disorder 2013









                                        Overview: 







                                        ICD10 Diagnosis Term Replacer Utility









                          Essential hypertension    2013









                                        Overview: 







                                        ICD10 Diagnosis Term Replacer Utility









                          Asthma                    2013









                                        Overview: 







                                        ICD10 Diagnosis Term Replacer Utility









                          Depression                2013



documented as of this encounter (statuses as of 10/13/2020)



Immunizations







                    Name                Administration Dates Next Due

 

                    Td                  2003          



documented as of this encounter



Social History







             Tobacco Use  Types        Packs/Day    Years Used   Date

 

             Never Smoker                                        









                Smokeless Tobacco: Never Used                                 









                Alcohol Use     Drinks/Week     oz/Week         Comments

 

                No                                              









                          Sex Assigned at Birth     Date Recorded

 

                          Not on file               



documented as of this encounter



Last Filed Vital Signs

Not on filedocumented in this encounter



Nursing Notes

Rosey Panchal - 10/13/2020 12:45 PM CDTFormatting of this note might be 
different from the original.

Venipuncture collection performed by clean technique on the right forearm(s). 
Total of 1 attempts were made. Slight pressure and a bandage/dressing were 
applied to the site(s). The patient experienced no complications. The following 
specimens were processed according to instructions and sent to [facility] laboratories
 per lab order on 10/13/20:



 LT BLUE

1 SST

 RED

1 LAV

 PPT

 DK GREEN (LiHep)

  DK GREEN (SodH)

 GRAY

 DK BLUE (K2)

 DK BLUE (S)

 ACD

 Blood Culture

 NIPT/NTD



Electronically signed by Rosey Panchal at 10/13/2020 12:47 PM CDTdocumented
 in this encounter



Plan of Treatment







             Date         Type         Specialty    Care Team    Description

 

             10/14/2020   Hospital Encounter Surgery      Stef Ortega MD



                                        



                                                                [address] [facility]



                                        



                                                                [address]



                                        



                                                                [place]

 [address]



                                        



                                                                [phone] (Fax) 

 

                10/14/2020      Anesthesia Event Surgery         Agustín Serna, BRIGITTE

[address]

[place], [place] [address]

[phone] (Fax) 

 

             10/14/2020   Surgery      Surgery      Stef Ortega TIBI AL PLATEAU ORIF MD



                                        



                                                                [address] [place], [place] [address]



                                        



                                                                [place]

 [address]



                                        



                                                                [phone] (Fax) 









             Name         Type         Priority     Associated Diagnoses Date/Ti

me

 

             CBC WITH DIFF LAB          Routine      Pre-operative clearance 10/

 12:47 PM CDT

 

             BASIC METABOLIC PANEL LAB          Routine      Pre-operative clear

ance 10/13/2020 12:47 PM 

CDT



             (NA, K, CL, CO2,                                        



             GLUCOSE, BUN,                                        



             CREATININE, CA)                                        









             Name         Type         Priority     Associated Diagnoses Order S

chedule

 

             CBC WITH DIFF LAB          Routine      Pre-operative clearance Exp

ected: 10/13/2020,



                                                                 Expires: 10/13/

2021

 

             BASIC METABOLIC PANEL LAB          Routine      Pre-operative clear

ance Expected: 10/13/2020,



             (NA, K, CL, CO2,                                        Expires: 10

/



             GLUCOSE, BUN,                                        



             CREATININE, CA)                                        









                Health Maintenance Due Date        Last Done       Comments

 

                HEPATITIS C (HCV) SCREEN 1965                      

 

                PNEUMOCOCCAL 0-64 YEARS COMBINED SERIES (1 of 1 - 09/10/1971    

                  



                PPSV23)                                         

 

                Depression Screening 09/10/1977                      

 

                DTaP,Tdap,and Td Vaccines (1 - Tdap) 09/10/1984      2003 

     

 

                Breast Cancer Screening (MAMMOGRAM) 09/10/2005                  

    

 

                COLON CANCER SCREENING ANNUAL FIT/FOBT 09/10/2015               

       

 

                COLON CANCER SCREENING FIT DNA EVERY 3 YEARS 09/10/2015         

             

 

                COLON CANCER SCREENING SIGMOIDOSCOPY EVERY 5 YEARS 09/10/2015   

                   

 

                COLONOSCOPY     09/10/2015                      

 

                Colorectal Cancer Screening 09/10/2015                      

 

                Zoster Recombinant Vaccine (SHINGRIX) (1 of 2) 09/10/2015       

               

 

                PAP SMEAR       2016      

 

                INFLUENZA VACCINE (#1) 2020                      



documented as of this encounter



Results

Not on filedocumented in this encounter



Visit Diagnoses







                                        Diagnosis

 

                                        Pre-operative clearance - Primary







                                        Preoperative examination, unspecified



documented in this encounter



Additional Health Concerns







                Infection       Onset Date      Last Indicated  Resolved Time

 

                COVID-19 Rule Out 10/13/2020      10/13/2020      



documented as of this encounter



Advance Directives







                Name            Relationship    Healthcare Agent Communication



                                                Relationship    

 

                Mik Zapata Spouse          Health Care Agent [phone]

[phone]



                                                                [phone]

[phone]



                                                                (Home)[email or web address]

## 2020-12-01 NOTE — XMS REPORT
Continuity of Care Document

                           Created on:2020



Patient:CAS PAN

Sex:Female

:1965

External Reference #:[id number]





Demographics







                          Address                   [address] [place] [address]

 

                          Home Phone                [phone]

 

                          Work Phone                [phone]

 

                          Mobile Phone              [phone]

 

                          Email Address             [email or web address]

 

                          Preferred Language        English

 

                          Marital Status            Unknown

 

                          Zoroastrianism Affiliation     Unknown

 

                          Race                      Unknown

 

                          Additional Race(s)        Unavailable



                                                    White

 

                          Ethnic Group              Unknown









Author







                          Organization              [facility]

t

 

                          Address                   [address]



                                                    [place] [address]

 

                          Phone                     [phone]









Support







                Name            Relationship    Address         Phone

 

                Primitivo Pan  Spouse          [address] [facility].  [phone]



                                                [place] [address] 









Care Team Providers







                    Name                Role                Phone

 

                    Roni Ortega MD Attending Clinician [phone]

 

                    Kareem BOURNE          Attending Clinician [phone]

 

                    Reinaldo YANEZ          Attending Clinician [phone]

 

                    Pob,  Lab Main      Attending Clinician Unavailable

 

                    Only,  Test         Attending Clinician Unavailable

 

                    Roni Ortega MD Admitting Clinician [phone]









Problems

This patient has no known problems.



Allergies, Adverse Reactions, Alerts

This patient has no known allergies or adverse reactions.



Medications

This patient has no known medications.



Procedures

This patient has no known procedures.



Encounters







        Start   End     Encounter Admission Attending Care    Care    Encounter 

Source



        Date/Time Date/Time Type    Type    Clinicians Facility Department ID   

   

 

        2020-10-14 2020-10-15 [facility]    [id number]

[id number] 



        07:46:00 14:39:00 Encounter         Stef Corea [id number]    

     



                                                [place] [id number]         



                                                [employer]  [phone]         



                                                        081             

 

        2020-10-14 2020-10-14 Anesthesia         Agustín Serna [facility]    [id number]

[id number] 



        14:38:00 16:07:00                 Ellen Najera 350.1.13.10  

       



                                                [place] 4.2.7.2.686         



                                                Surgical [phone]         



                                                [place]  [address]             

 

        2020-10-13 2020-10-13 Technician         Pob, [facility]    [id number]

[id number] 



        12:31:27 12:46:27 Visit           Lab Main [place] [id number]         



                                                [place] 4.2.7.2.686         



                                                [facility] [phone]         



                                                [facility]     353             



                                                [facility]                 

 

        2020-10-13 2020-10-13 Laboratory         Only, [facility]    [id number]

[id number] 



        12:30:53 12:45:53 Only            Test    [place] 350.1.13.10         



                                                [place] 4.2.7.2.686         



                                                [employer]  [phone]         



                                                        353             







Results

This patient has no known results.

## 2020-12-01 NOTE — XMS REPORT
Summary of Care

                           Created on:October 15, 2020



Patient:Moriah Pan

Sex:Female

:1965

External Reference #:[id number]





Demographics







                          Address                   [address] [place] [address]

 

                          Mobile Phone              [phone]

 

                          Home Phone                [phone]

 

                          Work Phone                [phone]

 

                          Preferred Language        English

 

                          Marital Status            

 

                          Yazidism Affiliation     Unknown

 

                          Race                      White

 

                          Ethnic Group              Not  or 









Author







                          Organization              [facility]

 

                          Address                   [address] [place] [address]









Support







                Name            Relationship    Address         Phone

 

                Asa Pan Unavailable     [address] [facility]  [phone]

[phone]



                                                [place] [address] 









Care Team Providers







                    Name                Role                Phone

 

                    Pcp,  Does Not Have A Primary Care Provider [phone]









Reason for Referral

 (Routine)





           Status     Reason     Specialty  Diagnoses / Referred By Referred To



                                            Procedures Contact    Contact

 

                New Request                     Diagnostic      Diagnoses



Closed fracture of left tibial plateau with routine healing, subsequent 
encounter                 Willian Ortega       



Procedures



FL TIME OR (NON-REPORTABLE)             Stef Tamayo MD



                                        



                                                       [address] [place] [address]



                                        



                                                       Phone:     



                                                                 [phone]



                                        



                                                       Fax:       



                                                       [phone] 









Reason for Visit

Auth/Cert





           Status     Reason     Specialty  Diagnoses / Procedures Referred By C

ontact Referred To 

Contact

 

                                                Surgery         Diagnoses



Displaced bicondylar fracture of left tibia, initial encounter for closed 
fracture



S82.142A (ICD-10-CM) - Displaced bicondylar fracture of left tibia,



initial encounter for closed fracture                           Adc Pre/Pacu/Pos

t







                                                                



Procedures



MA OPEN TX TIBIAL FRACTURE PROXIMAL UNICONDYLAR



[id number] - MA OPEN TX TIBIAL FRACTURE PROXIMAL UNICONDYLAR                         

  [address] [place], [place] [address]







                                                                  Phone: [phone]







                                                                  Fax: [phone]

[phone]









Encounter Details







             Date         Type         Department   Care Team    Description

 

             10/14/2020 - Hospital Encounter [facility],   Daija (

BMI



                    10/15/2020                              Surgery Unit



                                        Stef Tamayo MD



                                        30-39.9)



                                       [address] [address]



                                        [facility]



                                        



                                       [phone] [place] [address]



                                        



                                                                [phone] 



                                                    (Fax)        







Allergies

No Known Allergiesdocumented as of this encounter (statuses as of 10/15/2020)



Medications







          Medication Sig       Dispensed Refills   Start Date End Date  Status

 

          bisoprolol-hydrochlorothi Take 1 Tab by           0                   

          Active



          azide (ZIAC) 10-6.25 mg mouth daily.                                  

       



          per tablet                                                   

 

          lisdexamfetamine Take 40 mg by           0                            

 Active



          (VYVANSE) 40 mg capsule mouth every                                   

      



                    morning.                                          

 

          DULoxetine (CYMBALTA) 60 Take 60 mg by           0                    

         Active



          mg capsule mouth daily.                                         

 

          ibuprofen 800 mg tablet Take 1 tablet 15 tablet 0         2018  

         Active



                    by mouth 3                                         



                    (three) times                                         



                    daily with                                         



                    meals.                                            

 

          methocarbamol 750 mg Take 1 tablet 20 tablet 0         2018     

      Active



          tablet    by mouth 4                                         



                    (four) times                                         



                    daily.                                            

 

          traMADOL 50 mg tablet Take 1 tablet 20 tablet 0         2018    

       Active



                    by mouth every                                         



                    6 (six) hours                                         



                    as needed for                                         



                    Pain (scale                                         



                    1-3).                                             

 

          multivitamin (DAILY Take  by mouth.           0                       

      Active



          VITAMIN ORAL)                                                   

 

          biotin 1 mg Cap Take  by mouth.           0                           

  Active

 

          NON-FORMULARY MEDICATION Healthy brain           0                    

         Active



                    all day focus                                         

 

          AMARILIS'S WORT ORAL Take  by mouth.           0                      

       Active



documented as of this encounter (statuses as of 10/15/2020)



Active Problems







                          Problem                   Noted Date

 

                          Obesity (BMI 30-39.9)     10/14/2020

 

                          Post-op pain              10/14/2020

 

                          Morbid obesity with body mass index of 40.0-49.9 10/14

/2020

 

                          Encounter for routine gynecological examination 2013









                                        Overview: 







                                        ICD10 Diagnosis Term Replacer Utility









                          Attention deficit disorder 2013









                                        Overview: 







                                        ICD10 Diagnosis Term Replacer Utility









                          Essential hypertension    2013









                                        Overview: 







                                        ICD10 Diagnosis Term Replacer Utility









                          Asthma                    2013









                                        Overview: 







                                        ICD10 Diagnosis Term Replacer Utility









                          Depression                2013



documented as of this encounter (statuses as of 10/15/2020)



Immunizations







                    Name                Administration Dates Next Due

 

                    Td                  2003          



documented as of this encounter



Social History







             Tobacco Use  Types        Packs/Day    Years Used   Date

 

             Former Smoker                                        









                Smokeless Tobacco: Never Used                                 









                                        Comments: quit year and half ago









                Alcohol Use     Drinks/Week     oz/Week         Comments

 

                No                                              









                          Sex Assigned at Birth     Date Recorded

 

                          Not on file               









                    COVID-19 Exposure   Response            Date Recorded

 

                    In the last month, have you been in contact with No / Unsure

         10/13/2020  3:04 PM

CDT



                    someone who was confirmed or suspected to have              

       



                    Coronavirus / COVID-19?                     



documented as of this encounter



Last Filed Vital Signs







                Vital Sign      Reading         Time Taken      Comments

 

                Blood Pressure  122/71          10/15/2020 11:45 



                                                AM CDT          

 

                Pulse           79              10/15/2020 11:45 



                                                AM CDT          

 

                Temperature     36.8 C (98.2 F) 10/15/2020 11:45 



                                                AM CDT          

 

                Respiratory Rate 17              10/15/2020 11:45 



                                                AM CDT          

 

                Oxygen Saturation 99%             10/15/2020 11:45 



                                                AM CDT          

 

                Inhaled Oxygen  -               -               



                Concentration                                   

 

                Weight          100.7 kg (222 lb) 10/15/2020  4:32 without the C

PM



                                                AM CDT          machine on.

 

                Height          154.9 cm (5' 1") 10/14/2020  7:55 



                                                AM CDT          

 

                Body Mass Index 41.95           10/14/2020  7:55 



                                                AM CDT          



documented in this encounter



Discharge Instructions

AttachmentsThe following attachments cannot be sent through Care Everywhere.Hip 
Fracture Surgery, Discharge Instructions for (English)Managing Post-Op Pain at 
Home (English)Strength Training, Understanding (English)Crutches 
(Non-Weight-Bearing), Discharge Instructions (English)documented in this 
encounter



Progress Notes

Sarah Ceballos LBSW - 10/15/2020 11:54 AM CDTSubjective



Patient ID: Moriah Pan is a 55 year old female.



Care Management Social Functional Assessment

Patient Name: Moriah Pan     Age: 55 year old     Sex: female     
MRN: [id number]

Previous admit date: N/A



Current diagnosis and co-morbidities:

S82.142A (ICD-10-CM) - Displaced bicondylar fracture of left tibia, initial 
encounter for closed fracture;S82.142A;post op pain



Readmission Questions:

Was patient discharged from any acute care hospital within the last 30 days: No



Social Functional Assessment:

Primary language spoken/preferred: English

Mental Status: Alert &amp; Oriented to Person,Place &amp; Time

Information given by: Self

Patient's support system: Parent;Spouse

Name and number of support system: Asa Bey,  [phone] and pt's 
mother will be stayingwith her for next 6 weeks

Primary Care Giver: Self

MPOA: No

Living Arrangement: Home

Address of living arrangement : [address] [place] [address]

Persons living in home: Self;Spouse

Barriers to returning home: None

Baseline functional status- ambulation: Independent

Functional status-baseline personal care: Independent

Baseline functional status- driving: Independent

Baseline functional status- grocery shopping: Independent

Functional status-baseline housekeeping: Independent

Functional status-baseline meal prep: Independent

Current functional status same as prior: Yes

Do you have a PCP?: No

Refered to: [facility] physicians

Home Health Care Agency: No

Provider Services: No

DME Company: No

Equipment: Walker;Bedside Commode;Shower bench

Hemodialysis: No

Community resources utilized: None

Funding Resources: Self Pay

Prescription coverage plan: Self Pay

Pharmacy where meds are filled: Other

Other pharmacy: [facility]

Anticipated services prior to disharge: Continue Medical Eval

Expected mode of discharge transportation: Same as support system

Additional Recommendations for DC: Medical clearance, pt inquired about out of 
pocket cost for outpatient PT as she is unfunded. SW will research matter and 
f/u with pt.

Additional info required for discharge planning: Pending medical evaluation

Recommended discharge plan: Home



SFA Complete:

Social Functional Assessment complete: Yes



Alcohol Use Screening (AUDIT-C)

How often do you have a drink containing alcohol?: Never

SCORE: 0

Did patient elect to have resources provided: No



Role of Care Management explained.



Any issues or concerns with obtaining/affording your medications at home: no.



Are you or your support system able to  medications at discharge: yes.



Review of Systems



Objective



Physical Exam



Assessment/Plan

Home with family support. SW will notify patient of outpatient PT cost for 
private pay. Pt has all recommended dme.



KATHARINA Soto

 - Care Management

[facility]

[phone]

[email or web address]



Electronically signed by Sarah Ceballos LBSW at 10/15/2020 11:56 AM JENNYTLinda Chino, PT - 10/14/2020  4:00 PM CDT1

4:00 PM



CPM Initial Set-up



S:  Orders received.  Pt awake and alert. Pt said she could not feel her LEs at 
the moment.

O:  CPM adjusted for proper fit and padded for comfort.  Set-up for L knee with 
ROM at 0-90 degrees.

A:  Patient tolerated ROM well and reported no issues.

P:  Therapy will monitor CPM and will increase ROM of CPM while patient is in 
the hospital per MD orders as patient tolerates. PT will return for functional 
evaluation tomorrow. Thank you.



Julian Herrera, SPT

Linda Greco, PT

[place] PT License

[id number]

Formerly [facility]

Rehabilitation Services Department

[phone] (phone)

[phone] (fax)



Total Timed Treatment Codes in Minutes: 10

Total Treatment Time in Minutes:  10



Electronically signed by Linda Chino, LELAND at 10/15/2020  8:11 AM CDT
Terri Cruz - 10/14/2020  8:15 AM CDTRoutine rounding pre-surg visit; 
patient and mother were present; patient was offered and accepted prayer; no 
other support needed/wanted at this time;  support provided through 
presence and pre-op prayer; follow-up  support is available if 
needed/wanted.Electronically signed by Terri Cruz at 10/14/2020 10:25 AM 
CDTdocumented in this encounter



Consult Notes

Sarah Ceballos LBSW - 10/15/2020 12:02 PM CDTAssociated Order(s): CONSULT CARE 
MANAGER-ADULT; CONSULT CARE MANAGER-ADULTPt will return home with  and 
mother. Pt request information on out of pocket expense involved for outpatient 
PT. SW will provide info and assist with any additional needs. Pt has all 
recommended dme.



KATHARINA Soto

 - Care Management

[facility]

[phone]

[email or web address]

Electronically signed by Sarah Ceballos LBSW at 10/15/2020 12:04 PM CDT
documented in this encounter



Miscellaneous Notes

Care Plan - Linda Ceballos RN - 10/15/2020  8:03 AM CDT

Problem: Respiratory Function - Impaired

Goal: Adequate work of breathing

Outcome: Progressing as expected



Problem: Infection Risk

Goal: Absence of infection

Outcome: Progressing as expected



Problem: Pain

Goal: Control of pain at or below patient's documented comfort goal

Outcome: Progressing as expected

Goal: Reduction in pain sensation

Outcome: Progressing as expected



Problem: Pain

Goal: Control of pain at or below patient's documented comfort goal

Outcome: Progressing as expected

Goal: Reduction in pain sensation

Outcome: Progressing as expected



Problem: Discharge Planning

Goal: Absence of venous thromboembolism

Outcome: Progressing as expected

Goal: Adequate for discharge

Outcome: Progressing as expected

Goal: Effective communication

Outcome: Progressing as expected

Electronically signed by Linda Ceballos RN at 10/15/2020  8:03 AM CDTCare Plan
- Monalisa Montelongo RN - 10/15/2020  5:25 AM CDT

Problem: Respiratory Function - Impaired

Goal: Adequate work of breathing

Outcome: Progressing as expected

Electronically signed by Monalisa Montelongo RN at 10/15/2020  5:25 AM CDT
documented in this encounter



Plan of Treatment







                Health Maintenance Due Date        Last Done       Comments

 

                HEPATITIS C (HCV) SCREEN 1965                      

 

                PNEUMOCOCCAL 0-64 YEARS COMBINED SERIES (1 of 1 - 09/10/1971    

                  



                PPSV23)                                         

 

                DTaP,Tdap,and Td Vaccines (1 - Tdap) 09/10/1984      2003 

     

 

                Breast Cancer Screening (MAMMOGRAM) 09/10/2005                  

    

 

                COLON CANCER SCREENING ANNUAL FIT/FOBT 09/10/2015               

       

 

                COLON CANCER SCREENING FIT DNA EVERY 3 YEARS 09/10/2015         

             

 

                COLON CANCER SCREENING SIGMOIDOSCOPY EVERY 5 YEARS 09/10/2015   

                   

 

                COLONOSCOPY     09/10/2015                      

 

                Colorectal Cancer Screening 09/10/2015                      

 

                Zoster Recombinant Vaccine (SHINGRIX) (1 of 2) 09/10/2015       

               

 

                PAP SMEAR       2016      

 

                INFLUENZA VACCINE (#1) 2020                      

 

                LUNG CANCER SCREEN: Recommended for age 55-80 with 30 09/10/2020

                      



                + pack year history                                 

 

                Depression Screening 10/14/2021      10/14/2020      



documented as of this encounter



Implants







          Implanted Type      Area       Device    Shelf     Model /



                                                  Identifier Expiration Serial /



                                                            Date      Lot

 

          60.0 Cc Cancellous Cubes Bone      Left:     COMMUNITY TISSUE         

  2021 [id number] /



                                        Implanted: Qty: 1 on 10/14/2020 by Stef Murillo MD at [facility] Matrix     Knee        SERVICES                        N

/A /



                                                                      N/A

 

                          Plate Prox Tibial 3.5mm Va Locking 4 Hole 87mm Left Sy

nthes #.127. - Sn/A 

PLATE        Left:        Synthes       /



                                        Implanted: Qty: 1 on 10/14/2020 by Stef Murillo MD at [facility]            Leg                                         N

/A /



                                                                      N/A

 

             Screw, Synthes 3.5mm Cortex Slf-T 30mm #204.830 - Sn/A SCREW       

 Left:        Synthes      

204.830                                             204.830 /



                                        Implanted: Qty: 1 on 10/14/2020 by Stef Murillo MD at [facility]            Leg                                         N

/A /



                                                                      N/A

 

             Screw, Synthes 3.5mm Cortex Slf-T 26mm #204.826 - Sn/A SCREW       

 Left:        Synthes      

204.826                                             204.826 /



                                        Implanted: Qty: 1 on 10/14/2020 by Stef Murillo MD at [facility]            Leg                                         N

/A /



                                                                      N/A

 

             Screw, Synthes 3.5mm Cortex Slf-T 16mm #204.816 - Sn/A SCREW       

 Left:        Synthes      

204.816                                             204.816 /



                                        Implanted: Qty: 1 on 10/14/2020 by Stef Murillo MD at [facility]            Leg                                         N

/A /



                                                                      N/A

 

                                        Screw Va Locking St With T8 Stardrive Re

cess 3.5x65mm Synthes #[id number] - Sn/A

           SCREW      Left:      Synthes    02.127.1              02.127.165 /



                                        Implanted: Qty: 1 on 10/14/2020 by Stef Murillo MD at [facility]            Leg                                         N

/A /



                                                                      N/A

 

                                        Screw Va Locking St With T8 Stardrive Re

cess 3.5x70mm Synthes #[id number] - Sn/A

           SCREW      Left:      Synthes    02.127.170            02.127.170 /



                                        Implanted: Qty: 1 on 10/14/2020 by Stef Murillo MD at [facility]            Leg                                         N

/A /



                                                                      N/A

 

                K-Wire, Synthes 1.6mm W/ 5mm Thrd-Trocar Point 150mm #292.71 - S

n/A WIRE            Left:           

Synthes                                                     292.71 /



                                        Implanted: Qty: 1 on 10/14/2020 by Stef Murillo MD at [facility]            Leg                                         N

/A /



                                                                      N/A



documented as of this encounter



Procedures







             Procedure Name Priority     Date/Time    Associated   Comments



                                                    Diagnosis    

 

             FL TIME OR   Routine      10/14/2020  4:13 Closed fracture of Resul

ts for this



             (NON-REPORTABLE)              PM CDT       left tibial  procedure a

re in



                                                    plateau with the results



                                                    routine healing, section.



                                                    subsequent   



                                                    encounter    

 

             NOTICE OF PRIVACY Routine      10/13/2020 12:18              



             PRACTICES                 PM CDT                    

 

             NO SHOW OR MISSED Routine      10/13/2020 12:18              



             APPOINTMENT POLICY              PM CDT                    



             ACKNOWLEDGEMENT                                        

 

             CONSENT/REFUSAL FOR Routine      10/13/2020 12:18              



             DIAGNOSIS AND TREATMENT              PM CDT                    

 

             ASSIGNMENT OF BENEFITS Routine      10/13/2020 12:18              



                                       PM CDT                    

 

             ASSIGNMENT OF BENEFITS Routine      10/13/2020 12:17              



                                       PM CDT                    

 

             DSU PRE-OP   Routine      10/13/2020 12:01              



                                       AM CDT                    

 

             PHYSICIAN ORDERS Routine      10/13/2020 12:01              



                                       AM CDT                    



documented in this encounter



Results

FL TIME OR (NON-REPORTABLE) (10/14/2020  4:13 PM CDT)





                                        Specimen

 

                                        









                          Narrative                 Performed At

 

                          These images do not require a Radiology diagnostic rep

ort. PACS









                Performing Organization Address         City/State/Zipcode Phone

 Number

 

                PACS                                            



documented in this encounter



Visit Diagnoses







                                        Diagnosis

 

                                        Closed fracture of left tibial plateau w

ith routine healing, subsequent 

encounter -



                                        Primary

 

                                        Obesity (BMI 30-39.9)







                                        Obesity, unspecified

 

                                        Post-op pain







                                        Other acute postoperative pain

 

                                        Morbid obesity with body mass index of 4

0.0-49.9



documented in this encounter



Administered Medications







           Medication Order MAR Action Action Date Dose       Rate       Site









                                        FENTanyl PF (SUBLIMAZE (PF)) injection 2

5 mcg



                                        



                          25 mcg, Slow IV Push, Q5MIN PRN, 4 doses, Starting Wed

 10/14/20 at 1618, Until 



                          Discontinued, Routine, Pain (scale 4-6), PACU 

 

                                                    









             HYDROcodone-acetaminophen (NORCO)  Given        10/15/2020  2

:10 PM CDT 1 tablet     

                                        



                                        mg tablet 1 tablet



                                                                 



           1 tablet, Oral, Q4HPRN, Starting Wed                                 

            



           10/14/20 at 1613, Until Discontinued,                                

             



           Routine, Pain (scale 7-10)                                           

  









             Given        10/15/2020 11:06 AM CDT 1 tablet                  

 

             Given        10/15/2020  7:36 AM CDT 1 tablet                  









                                                    

 

                                        HYDROmorphone (DILAUDID) injection 0.2 m

g



                                        



                          0.2 mg, Slow IV Push, Q5MIN PRN, 10 doses, Starting We

d 10/14/20 at 1618, Until 



                                        Discontinued, Routine, Pain (scale 7-10)

, PACU, Use approved by (Faculty): PACU 

USE                                     



                          -ANESTHESIA SERVICE-HYDROMORPHONE INJECTIONS 

 

                                                    









           lactated ringers IV infusion New Bag    10/15/2020  7:26 AM CDT 1,000

 mL   75 mL/hr   



                                        1,000 mL



                                                                 



           at 75 mL/hr, 1,000 mL, IV                                            

 



           Infusion, CONTINUOUS, Starting                                       

      



           Wed 10/14/20 at 1630, Until                                          

   



           Discontinued, Routine, PACU                                          

   









             New Bag      10/14/2020  5:56 PM CDT 1,000 mL     75 mL/hr     









                                                    









                                        ondansetron (ZOFRAN (PF)) injection 4 mg



             Given        10/15/2020  7:27 AM CDT 4 mg                      



           4 mg, Slow IV Push, Q6HPRN, Starting Wed                             

                



           10/14/20 at 1613, Until Discontinued, Routine,                       

                      



           Nausea and Vomiting (N/V)                                            

 









             Given        10/15/2020 12:31 AM CDT 4 mg                      

 

             Given        10/14/2020  4:44 PM CDT 4 mg                      









                                                    









           sodium chloride 0.9 % irrigation Given      10/14/2020  3:16 PM CDT 1

,000 mL              Left 

Leg



                                        solution



                                                                 



           PRN, Starting Wed 10/14/20 at 0836,                                  

           



           Until Discontinued, Intra-op                                         

    









             Given        10/14/2020  8:36 AM CDT 1,000 mL                  Left

 Leg









                                                    









                                        









           Medication Order MAR Action Action Date Dose       Rate       Site

 

           ceFAZolin (ANCEF) 1 g in NaCl 0.9% Given      10/15/2020  7:27 AM CDT

 1 g                   



                                        (NS) 50 mL MINI-BAG



                                                                 



           1 g, IV Piggyback, Q8H ABX, 2                                        

     



           doses, First dose (after last                                        

     



           modification) on Wed 10/14/20 at                                     

        



           2300, Last dose on Thu 10/15/20 at                                   

          



           0700, 50 mL, Reason for                                             



           Anti-Infective: Surgical                                             



           Prophylaxis, Surgical Prophylaxis:                                   

          



           Orthopaedic, Duration of therapy:                                    

         



           within 24 hours of surgery                                           

  









             Given        10/15/2020 12:36 AM CDT 1 g                       









                                                    









             diphenhydrAMINE (BENADRYL) injection 12.5 Given        10/14/2020  

6:44 PM CDT 12.5 mg      

                                        



                                        mg



                                                                 



           12.5 mg, Slow IV Push, ONCE, 1 dose, Wed                             

                



           10/14/20 at 1945, Routine                                            

 









                                                    









                                        diphenhydrAMINE (BENADRYL) tablet 50 mg



             Given        10/15/2020  3:46 AM CDT 50 mg                     



           50 mg, Oral, Q6HPRN, Starting Thu 10/15/20 at                        

                     



           0300, Until u 10/15/20 at 0652, Routine,                           

                  



           itching                                                









                                                    









           lactated ringers IV infusion New Bag    10/14/2020  8:02 AM CDT 1,000

 mL   42 mL/hr   



                                        1,000 mL



                                                                 



           at 42 mL/hr, 1,000 mL, IV                                            

 



           Infusion, ONCE, 1 dose, Wed                                          

   



           10/14/20 at 0800, Routine, DSU                                       

      



           Pre-op                                                 









                                                    









                                        ondansetron (ZOFRAN (PF)) injection 4 mg



             Given        10/14/2020  6:43 PM CDT 4 mg                      



           4 mg, Slow IV Push, PRN, 1 dose, Starting Wed                        

                     



           10/14/20 at 1618, Until Wed 10/14/20 at 1843,                        

                     



           Routine, Nausea and Vomiting (N/V), PACU                             

                









                                                    



documented in this encounter



Advance Directives







                Name            Relationship    Healthcare Agent Communication



                                                Relationship    

 

                Mik Zapata Spouse          Health Care Agent [phone]

[phone]



                                                                [phone]



                                                                (Home)[email or web address]

## 2020-12-01 NOTE — RAD REPORT
EXAM DESCRIPTION:  CT - Abdomen   Pelvis W Contrast - 12/1/2020 8:38 pm

 

CLINICAL HISTORY:  epigastric pain

 

COMPARISON:  No comparisons

 

TECHNIQUE:  Biphasic, helical CT imaging of the abdomen and pelvis was performed following 100 ml non
-ionic IV contrast.

 

No oral contrast administered.

 

All CT scans are performed using dose optimization technique as appropriate and may include automated
 exposure control or mA/KV adjustment according to patient size.

 

FINDINGS:  A few small 3 mm or less pulmonary nodules are present largest of which appears calcified.
 These are probably small granulomas. No follow-up recommendations made for nodules of this size. No 
pericardial effusion.

 

The liver, small splenic remnant or regenerative nodule, and pancreas show no suspicious findings. We
ll filled gallbladder shows no wall thickening or edema. Gallstones can be occult. Biliary tree is mi
ldly prominent. Duct stones can be occult on CT imaging as well.

 

Symmetric renal function is seen with no hydronephrosis or suspicious renal mass. No pyelonephritis o
r acute parenchymal process. No bladder abnormalities. No adrenal abnormalities. Uterus and ovaries s
how no suspicious findings.

 

No dilated bowel loops or bowel wall thickening. Patient has a mobile cecum configuration with the ce
cum and in the midline mid abdomen. No appendicitis findings. No free air, free fluid or inflammatory
 stranding.  No hernia, mass or bulky lymphadenopathy.

 

No suspicious bony findings.

 

 

IMPRESSION:  Contrast enhanced CT abdomen and pelvis showing no acute or emergent finding.

 

Gallstones and duct stones can be occult on CT imaging.

## 2020-12-01 NOTE — XMS REPORT
Summary of Care

                           Created on:2020



Patient:Moriah Pan

Sex:Female

:1965

External Reference #:[id number]





Demographics







                          Address                   [address] [place] [address]

 

                          Mobile Phone              [phone]

 

                          Home Phone                [phone]

 

                          Work Phone                [phone]

 

                          Preferred Language        English

 

                          Marital Status            

 

                          Congregational Affiliation     Unknown

 

                          Race                      White

 

                          Ethnic Group              Not  or 









Author







                          Organization              [facility]

 

                          Address                   [address] [place] [address]









Support







                Name            Relationship    Address         Phone

 

                Asa Pan Unavailable     [address] [facility]  [phone]

[phone]



                                                [place] [address] 









Care Team Providers







                    Name                Role                Phone

 

                    Pcp,  Does Not Have A Primary Care Provider [phone]









Reason for Visit







                          Reason                    Comments

 

                          LAB WORK                  



Auth/Cert





           Status     Reason     Specialty  Diagnoses / Referred By Referred To



                                            Procedures Contact    Contact

 

                                                Clinical Medical Diagnoses



Displaced bicondylar fracture of left tibia, initial encounter for closed 
fracture



Displaced bicondylar fracture of left tibia, initial encounter for closed 
fracture [S82.142A]                                 [facility]







                                                Laboratory      



Procedures



COVID-19 (ID NOW RAPID TESTING)



COVID                                               [address] [place], [place]



                                                                  [address]







                                                                  Phone:



                                                                  [phone]







                                                                  Fax:



                                                                  [phone]









Encounter Details







             Date         Type         Department   Care Team    Description

 

                10/13/2020      Laboratory Only [facility]     Shannon, Hima Tamayo MD



[address] [facility] [address]



[place], [place] [address] [phone] (Fax)                      Pre-operative



                                                Phlebotomy      



Only, [facility] Test                          clearance (Primary



                                                            [facility]



                                                    Dx)



                                       [address], [place]              



                                                            [address] [phone]              







Allergies

No Known Allergiesdocumented as of this encounter (statuses as of 10/13/2020)



Medications







          Medication Sig       Dispensed Refills   Start Date End Date  Status

 

          bisoprolol-hydrochlorothi Take 1 Tab by           0                   

          Active



          azide (ZIAC) 10-6.25 mg mouth daily.                                  

       



          per tablet                                                   

 

          lisdexamfetamine Take 40 mg by           0                            

 Active



          (VYVANSE) 40 mg capsule mouth every                                   

      



                    morning.                                          

 

          DULoxetine (CYMBALTA) 60 Take 60 mg by           0                    

         Active



          mg capsule mouth daily.                                         

 

          ibuprofen 800 mg tablet Take 1 tablet 15 tablet 0         2018  

         Active



                    by mouth 3                                         



                    (three) times                                         



                    daily with                                         



                    meals.                                            

 

          methocarbamol 750 mg Take 1 tablet 20 tablet 0         2018     

      Active



          tablet    by mouth 4                                         



                    (four) times                                         



                    daily.                                            

 

          traMADOL 50 mg tablet Take 1 tablet 20 tablet 0         2018    

       Active



                    by mouth every                                         



                    6 (six) hours                                         



                    as needed for                                         



                    Pain (scale                                         



                    1-3).                                             



documented as of this encounter (statuses as of 10/13/2020)



Active Problems







                          Problem                   Noted Date

 

                          Encounter for routine gynecological examination 2013









                                        Overview: 







                                        ICD10 Diagnosis Term Replacer Utility









                          Attention deficit disorder 2013









                                        Overview: 







                                        ICD10 Diagnosis Term Replacer Utility









                          Essential hypertension    2013









                                        Overview: 







                                        ICD10 Diagnosis Term Replacer Utility









                          Asthma                    2013









                                        Overview: 







                                        ICD10 Diagnosis Term Replacer Utility









                          Depression                2013



documented as of this encounter (statuses as of 10/13/2020)



Immunizations







                    Name                Administration Dates Next Due

 

                    Td                  2003          



documented as of this encounter



Social History







             Tobacco Use  Types        Packs/Day    Years Used   Date

 

             Never Smoker                                        









                Smokeless Tobacco: Never Used                                 









                Alcohol Use     Drinks/Week     oz/Week         Comments

 

                No                                              









                          Sex Assigned at Birth     Date Recorded

 

                          Not on file               



documented as of this encounter



Last Filed Vital Signs

Not on filedocumented in this encounter



Nursing Notes

Rosey Panchal - 10/13/2020 12:30 PM CDTcovidElectronically signed by 
Rosey Panchal at 10/13/2020 12:47 PM CDTdocumented in this encounter



Plan of Treatment







             Date         Type         Specialty    Care Team    Description

 

             10/14/2020   Hospital Encounter Surgery      Stef Ortega MD



                                        



                                                                [address] [facility] [address]

[address]

 [address]



                                        



                                                                [phone]



                                        



                                                    [phone] (Fax) 

 

                10/14/2020      Anesthesia Event Surgery         Agustín Serna C

[address]

[phone] (Fax) 

 

             10/14/2020   Surgery      Surgery      Stef Ortega TIBI AL PLATEAU ORIF MD



                                        



                                                                [address] [facility] [address]

[address]

 [address]



                                        



                                                                [phone] (Fax) 









             Name         Type         Priority     Associated Diagnoses Order S

cheroxanne

 

             COVID-19 (ID NOW RAPID LAB          Routine      Pre-operative katja mccall Expected: 10/13/2020,



             TESTING)                                            Expires: 10/13/

2021









                Health Maintenance Due Date        Last Done       Comments

 

                HEPATITIS C (HCV) SCREEN 1965                      

 

                PNEUMOCOCCAL 0-64 YEARS COMBINED SERIES (1 of 1 - 09/10/1971    

                  



                PPSV23)                                         

 

                Depression Screening 09/10/1977                      

 

                DTaP,Tdap,and Td Vaccines (1 - Tdap) 09/10/1984      2003 

     

 

                Breast Cancer Screening (MAMMOGRAM) 09/10/2005                  

    

 

                COLON CANCER SCREENING ANNUAL FIT/FOBT 09/10/2015               

       

 

                COLON CANCER SCREENING FIT DNA EVERY 3 YEARS 09/10/2015         

             

 

                COLON CANCER SCREENING SIGMOIDOSCOPY EVERY 5 YEARS 09/10/2015   

                   

 

                COLONOSCOPY     09/10/2015                      

 

                Colorectal Cancer Screening 09/10/2015                      

 

                Zoster Recombinant Vaccine (SHINGRIX) (1 of 2) 09/10/2015       

               

 

                PAP SMEAR       2016      

 

                INFLUENZA VACCINE (#1) 2020                      



documented as of this encounter



Results

Not on filedocumented in this encounter



Visit Diagnoses







                                        Diagnosis

 

                                        Pre-operative clearance - Primary







                                        Preoperative examination, unspecified



documented in this encounter



Additional Health Concerns







                Infection       Onset Date      Last Indicated  Resolved Time

 

                COVID-19 Rule Out 10/13/2020      10/13/2020      



documented as of this encounter



Advance Directives







                Name            Relationship    Healthcare Agent Communication



                                                Relationship    

 

                Asa Pan Spouse          Health Care Agent [phone]

13



                                                                (Mobile)[phone]



                                                                (Home)[email or web address]

## 2020-12-01 NOTE — XMS REPORT
Summary of Care

                           Created on:2020



Patient:Moriah Pan

Sex:Female

:1965

External Reference #:[id number]





Demographics







                          Address                   [address] [place] [address]

 

                          Mobile Phone              [phone]

 

                          Home Phone                [phone]

 

                          Work Phone                [phone]

 

                          Preferred Language        English

 

                          Marital Status            

 

                          Adventism Affiliation     Unknown

 

                          Race                      White

 

                          Ethnic Group              Not  or 









Author







                          Organization              [facility]

 

                          Address                   [address] [place] [address]









Support







                Name            Relationship    Address         Phone

 

                Asa Pan Unavailable     [address] [facility]  [phone]

[phone]



                                                [place] [address] 









Care Team Providers







                    Name                Role                Phone

 

                    Pcp,  Does Not Have A Primary Care Provider [phone]









Reason for Visit

Auth/Cert





           Status     Reason     Specialty  Diagnoses / Procedures Referred By C

ontact Referred To 

Contact

 

                                                Surgery         Diagnoses



Displaced bicondylar fracture of left tibia, initial encounter for closed 
fracture



S82.142A (ICD-10-CM) - Displaced bicondylar fracture of left tibia,



initial encounter for closed fracture                           Adc Pre/Pacu/Pos

t







                                                                



Procedures



CO OPEN TX TIBIAL FRACTURE PROXIMAL UNICONDYLAR



[id number] - CO OPEN TX TIBIAL FRACTURE PROXIMAL UNICONDYLAR                         

  [address] [place], [place] [address]







                                                                  Phone: [phone]







                                                                  Fax: [phone]









Encounter Details







             Date         Type         Department   Care Team    Description

 

                10/14/2020      Anesthesia      [facility] CORTNEY Grimes MD



[address] [facility] [id number]



[place], [place] [address] [phone] (Fax)                      



                                                            [facility]



                                        



Agustín Serna CRNA



[address] [place] [address] [phone] (Fax)                      



                                                            [address] [place] [address] Dr seth



                                                    



                                                            [place], [place] [address] [phone]              







Allergies

No Known Allergiesdocumented as of this encounter (statuses as of 10/22/2020)



Medications







          Medication Sig       Dispensed Refills   Start Date End Date  Status

 

          bisoprolol-hydrochlorothi Take 1 Tab by           0                   

          Active



          azide (ZIAC) 10-6.25 mg mouth daily.                                  

       



          per tablet                                                   

 

          lisdexamfetamine Take 40 mg by           0                            

 Active



          (VYVANSE) 40 mg capsule mouth every                                   

      



                    morning.                                          

 

          DULoxetine (CYMBALTA) 60 Take 60 mg by           0                    

         Active



          mg capsule mouth daily.                                         

 

          ibuprofen 800 mg tablet Take 1 tablet 15 tablet 0         2018  

         Active



                    by mouth 3                                         



                    (three) times                                         



                    daily with                                         



                    meals.                                            

 

          methocarbamol 750 mg Take 1 tablet 20 tablet 0         2018     

      Active



          tablet    by mouth 4                                         



                    (four) times                                         



                    daily.                                            

 

          traMADOL 50 mg tablet Take 1 tablet 20 tablet 0         2018    

       Active



                    by mouth every                                         



                    6 (six) hours                                         



                    as needed for                                         



                    Pain (scale                                         



                    1-3).                                             

 

          multivitamin (DAILY Take  by mouth.           0                       

      Active



          VITAMIN ORAL)                                                   

 

          biotin 1 mg Cap Take  by mouth.           0                           

  Active

 

          NON-FORMULARY MEDICATION Healthy brain           0                    

         Active



                    all day focus                                         

 

          AMARILIS'S WORT ORAL Take  by mouth.           0                      

       Active



documented as of this encounter (statuses as of 10/22/2020)



Active Problems







                          Problem                   Noted Date

 

                          Obesity (BMI 30-39.9)     10/14/2020

 

                          Post-op pain              10/14/2020

 

                          Morbid obesity with body mass index of 40.0-49.9 10/14

/2020

 

                          Encounter for routine gynecological examination 2013









                                        Overview: 







                                        ICD10 Diagnosis Term Replacer Utility









                          Attention deficit disorder 2013









                                        Overview: 







                                        ICD10 Diagnosis Term Replacer Utility









                          Essential hypertension    2013









                                        Overview: 







                                        ICD10 Diagnosis Term Replacer Utility









                          Asthma                    2013









                                        Overview: 







                                        ICD10 Diagnosis Term Replacer Utility









                          Depression                2013



documented as of this encounter (statuses as of 10/22/2020)



Immunizations







                    Name                Administration Dates Next Due

 

                    Td                  2003          



documented as of this encounter



Social History







             Tobacco Use  Types        Packs/Day    Years Used   Date

 

             Former Smoker                                        









                Smokeless Tobacco: Never Used                                 









                                        Comments: quit year and half ago









                Alcohol Use     Drinks/Week     oz/Week         Comments

 

                No                                              









                          Sex Assigned at Birth     Date Recorded

 

                          Not on file               









                    COVID-19 Exposure   Response            Date Recorded

 

                    In the last month, have you been in contact with No / Unsure

         10/13/2020  3:04 PM

CDT



                    someone who was confirmed or suspected to have              

       



                    Coronavirus / COVID-19?                     



documented as of this encounter



Last Filed Vital Signs







                Vital Sign      Reading         Time Taken      Comments

 

                Blood Pressure  -               -               

 

                Pulse           -               -               

 

                Temperature     -               -               

 

                Respiratory Rate 14              10/14/2020  4:00 PM CDT 

 

                Oxygen Saturation -               -               

 

                Inhaled Oxygen Concentration -               -               

 

                Weight          -               -               

 

                Height          -               -               

 

                Body Mass Index -               -               



documented in this encounter



OR Notes

Anesthesia Postprocedure Evaluation - Ellen Najera MD - 10/14/2020  5:24 PM 
CDTFormatting of this note might be different from the original.

Patient: Moriah Pan



Procedure Summary

 Date: 10/14/20 Room / Location: [address]

 Anesthesia Start: 1438 Anesthesia Stop: 1607

 Procedure: TIBIAL PLATEAU ORIF (Left Leg) Diagnosis: (SAME)

 Surgeon: Stef Ortega MD Responsible Provider: Ellen Najera MD

 Anesthesia Type: General, Spinal, TIVA ASA Status: 3







Anesthesia Type: General, Spinal, TIVA



Last vitals

/63 (10/14/20 1710)

Temp 36.7 C (98 F) (10/14/20 1712)

Pulse 58 (10/14/20 1712)

Resp 12 (10/14/20 1712)

SpO2 93 % (10/14/20 1712)



No complications documented.



Anesthesia Post Evaluation



Patient location during evaluation: PACU

Patient participation: complete - patient participated

Level of consciousness: awake and alert

Pain management: satisfactory to patient

Airway patency: patent

Cardiovascular status: acceptable and blood pressure returned to baseline

Respiratory status: acceptable

Hydration status: acceptable

Comments: Anesthesia Post Operative Faculty Note



Patient examined, patient awake. Block receding normally.

Patient participation in post anesthesia evaluation: Patient participated.

Vital Signs: /63  | Pulse 58  | Temp 36.7 C (98 F)  | Resp 12  | Ht 
1.549 m (5' 1")  | Wt 90.7 kg (200 lb)  | SpO2 93%  | BMI 37.79 kg/m

Pain: Scale used: 0 - 10

Rating: 3



Nausea and vomiting: Not present.

Post operative/post procedure hydration status: Euvolemic.

Post-operative course: Patient discharged from PACU according to criteria.

Complications: No apparent complications

Ellen Najera MD  10/14/2020  5:25 PM



Electronically signed by Ellen Najera MD at 10/14/2020  5:25 PM CDT
Anesthesia Procedure Notes - Ellen Najera MD - 10/14/2020  5:21 PM CDT
Associated Order(s): Central Neuraxial Block

Central Neuraxial Block



Performed by: Ellen Najera MD

Authorized by: Ellen Najera MD



Start Time:  10/14/2020 2:30 PM

End Time:  10/14/2020 2:35 PM

Reason for Block:  Surgical anesthesia

Staff:

  Anesthesiologist:  Ellen Najera MD

  Performed by:  Anesthesiologist

 patient identified, risks and benefits explained, monitors and equipment 
checked, IV checked, anesthesia consent and pre-op evaluation

Epidural:

  Patient Position:  Sitting

  Prep: Betadine

  Monitoring:  Heart rate, cardiac monitor / EKG, continuous pulse ox and NIBP

  Location:  Lumbar (1-5)

  Lumbar:  L3-L4

  Approach:  Midline

Needle and Epidural Catheter:

  Epidural Kit:  SurgiQuest (BD)

  Needle Type:  Juvenal

 Number of Attempts: 1

Test Dose:  No test dose



Assessment:

  Sensory Level:  Above T10



Electronically signed by Ellen Najera MD at 10/14/2020  5:23 PM CDT
Anesthesia Preprocedure Evaluation - Venkat Henriquez MD - 10/13/2020  9:56 AM
CDTFormatting of this note might be different from the original.



Name/ MRN / Age / Gender:

Moriah Pan, [id number]

55 year old female

 BMI:

Estimated body mass index is 34.75 kg/m as calculated from the following:

  Height as of 18: 1.575 m (5' 2").

  Weight as of 18: 86.2 kg (190 lb).



Allergies:

Patient has no known allergies.





 Last Vitals:

BP Readings from Last 1 Encounters:

18 (!) 140/78



Pulse Readings from Last 1 Encounters:

18 74



SpO2 Readings from Last 1 Encounters:

18 98%







Date of Surgery: 10/14/2020

Surgeon: Stef Ortega MD

Procedure: TIBIAL PLATEAU ORIF (Left Leg)

OR Location: [facility] OR Location



Anesthesia Preop Screen (no physical exam)





Anesthesia Preop: Face-to-Face



PONV Risk Factors: female and non-smoker



Anesthesia History

Anesthesia History Negative



Previous Anesthetics/Airways



Cardiovascular



(+) Hypertension



 Pulmonary



(+) Asthma and mild

(+) Tobacco use

Tobacco Quit Date: 2019





Neuro/Musculoskeletal

(+) Psychiatric history and depression



 GI/Hepatic

(+) Hepatitis and Hep C



Hematology



(+) Patient accepts blood transfusion



 Renal

Negative Renal ROS





Skin

Skin ROS Negative per Chart Review



 Endo/Other

Negative Endo/Other ROS





Other



(+) Tobacco use

Tobacco Quit Date: 2019



 OB/GYN

OB/GYN N/A





Pediatric

Pediatric N/A



 



Preoperative Medication Instructions



Continue taking all prescribed medications except:

ACE inhibitors, ARBs, diuretics, all oral diabetes medications

Insulin: Take 1/2 dose the night prior to surgery. Hold on DOS.

Anticoagulant Therapy: Defer to surgeons

Phentermine: Alert [facility] anesthesiologist



MAC Cases: Continue taking ACE inhibitors and ARBs



 ASA Classification

ASA: 3

ASA Comments: Hep C, Asthma, HTN



COVID Pending







Current Medications:

No outpatient medications have been marked as taking for the 10/14/20 encounter 
(Hospital Encounter).



Previous Surgeries:

Past Surgical History:

Procedure Laterality Date

 REMOVAL SPLEEN, TOTAL  1970

 SURGICAL EXTRACTION - ERUPTED TEETH

 removal of 4 wisdom teeth and bicuspid x 4

 TUBAL LIGATION  1998

 TUBAL REANASTOMOSIS  

 had tubal reversal



Anesthesia Physical Exam



General

no apparent distress and alert and oriented x 3

  Neuro/Psych

neurological



Dental

poor dentition

 Abdominal

GI exam normal



Airway

Mallampati score:II

TM distance:&gt; 5 cm

Neck ROM: full

Mouth opening:normal



 Extremity

Normal extremity



Pulmonary

pulmonary exam normal  Other



Cardiovascular

cardiovascular exam normal



Anesthesia Plan



ASA Status: 3

Plan discussed during pre-op evaluation: General and Spinal

Anesthetic plan on DOS: General and Spinal

Plan to include: IV induction and LMA

Anesthesia plan discussed with: patient or representative

Post-Operative Analgesia: routine analgesia &amp; antiemetics

Recovery Plan: PACU

Additional comments:

Electronically signed by Venkat Henriquez MD at 10/14/2020  8:41 AM CDT
documented in this encounter



Miscellaneous Notes

Addendum Note - Agustín Serna CRNA - 10/22/2020 11:36 AM CDTFormatting of this 
note might be different from the original.

Addendum  created 10/22/20 1136 by Agustín Serna CRNA

 Intraprocedure Meds edited

Electronically signed by Agustín Serna CRNA at 10/22/2020 11:36 AM CDTdocumented
in this encounter



Plan of Treatment







                Health Maintenance Due Date        Last Done       Comments

 

                HEPATITIS C (HCV) SCREEN 1965                      

 

                PNEUMOCOCCAL 0-64 YEARS COMBINED SERIES (1 of 1 - 09/10/1971    

                  



                PPSV23)                                         

 

                DTaP,Tdap,and Td Vaccines (1 - Tdap) 09/10/1984      2003 

     

 

                Breast Cancer Screening (MAMMOGRAM) 09/10/2005                  

    

 

                COLON CANCER SCREENING ANNUAL FIT/FOBT 09/10/2015               

       

 

                COLON CANCER SCREENING FIT DNA EVERY 3 YEARS 09/10/2015         

             

 

                COLON CANCER SCREENING SIGMOIDOSCOPY EVERY 5 YEARS 09/10/2015   

                   

 

                COLONOSCOPY     09/10/2015                      

 

                Colorectal Cancer Screening 09/10/2015                      

 

                Zoster Recombinant Vaccine (SHINGRIX) (1 of 2) 09/10/2015       

               

 

                PAP SMEAR       2016      

 

                INFLUENZA VACCINE (#1) 2020                      

 

                LUNG CANCER SCREEN: Recommended for age 55-80 with 30 09/10/2020

                      



                + pack year history                                 

 

                Depression Screening 10/14/2021      10/14/2020      



documented as of this encounter



Implants







          Implanted Type      Area       Device    Shelf     Model /



                                                  Identifier Expiration Serial /



                                                            Date      Lot

 

                                        Cancellous Cubes, [facility] Tissue Servi

rajat Freeze Dried 60.0 Cc #1203-12 - Sn/A

           BONE       Left:      Formerly [facility] Tissue            2021 1203-12 

/



                                        Implanted: Qty: 1 on 10/14/2020 by Stef Murillo MD at [facility]            Knee       Services                         N

/A /



                                                                      N/A

 

                          Plate Prox Tibial 3.5mm Va Locking 4 Hole 87mm Left Sy

[facility] #[id number] - Sn/A 

PLATE        Left: Leg    Synthes      [id number] /



                                        Implanted: Qty: 1 on 10/14/2020 by Stef Murillo MD at [facility]                                                        N

/A /



                                                                      N/A

 

             Screw, Synthes 3.5mm Cortex Slf-T 30mm #204.830 - Sn/A SCREW       

 Left: Leg    Synthes      

204.830                                             204.830 /



                                        Implanted: Qty: 1 on 10/14/2020 by Stef Murillo MD at [facility]                                                        N

/A /



                                                                      N/A

 

             Screw, Synthes 3.5mm Cortex Slf-T 26mm #204.826 - Sn/A SCREW       

 Left: Leg    Synthes      

204.826                                             204.826 /



                                        Implanted: Qty: 1 on 10/14/2020 by Stef Murillo MD at [facility]                                                        N

/A /



                                                                      N/A

 

             Screw, Synthes 3.5mm Cortex Slf-T 16mm #204.816 - Sn/A SCREW       

 Left: Leg    Synthes      

204.816                                             204.816 /



                                        Implanted: Qty: 1 on 10/14/2020 by Stef Murillo MD at [facility]                                                        N

/A /



                                                                      N/A

 

                                        Screw Va Locking St With T8 Stardrive Re

cess 3.5x65mm Synthes #[id number] - Sn/A

           SCREW      Left: Leg  Synthes    02.127.1              02.127.165 /



                                        Implanted: Qty: 1 on 10/14/2020 by Stef Murillo MD at [facility]                                                        N

/A /



                                                                      N/A

 

                                        Screw Va Locking St With T8 Stardrive Re

cess 3.5x70mm Synthes #[id number] - Sn/A

           SCREW      Left: Leg  Synthes    02.127.170            02.127.170 /



                                        Implanted: Qty: 1 on 10/14/2020 by Stef Murillo MD at [facility]                                                        N

/A /



                                                                      N/A

 

                    K-Wire, Synthes 1.6mm W/ 5mm Thrd-Trocar Point 150mm #292.71

 - Sn/A WIRE                Left: 

Leg             Synthes                                         292.71 /



                                        Implanted: Qty: 1 on 10/14/2020 by Stef Murillo MD at [facility]                                                        N

/A /



                                                                      N/A



documented as of this encounter



Procedures







             Procedure Name Priority     Date/Time    Associated Diagnosis Comme

nts

 

             CENTRAL NEURAXIAL Routine      10/14/2020  5:21 PM              Res

ults for this



             BLOCK                     CDT                       procedure are i

n



                                                                 the results



                                                                 section.



documented in this encounter



Results

Central Neuraxial Block (10/14/2020  5:21 PM CDT)





                          Narrative                 Performed At

 

                                        Ellen Najera MD   10/14/2020 5

:23 PM



                                        



                                         



                                        



                                        Central Neuraxial Block 



                                        



                                         



                                        



                                         



                                        



                                        Performed by: Ellen Najera MD



                                        



                                        Authorized by: Ellen Najera MD 



                                        



                                         



                                        



                                        Start Time: 10/14/2020 2:30 PM



                                        



                                        End Time: 10/14/2020 2:35 PM



                                        



                                        Reason for Block: Surgical anesthesia



                                        



                                        Staff: 



                                        



                                         Anesthesiologist: Ellen Najera MD



                                        



                                         Performed by: Anesthesiologist



                                        



                           patient identified, risks and benefits explained, mon

itors and 



                                        equipment 



                                        



                                        checked, IV checked, anesthesia consent 

and pre-op evaluation



                                        



                                        Epidural: 



                                        



                                         Patient Position: Sitting



                                        



                                         Prep: Betadine 



                                        



                           Monitoring: Heart rate, cardiac monitor / EKG, co

ntinuous pulse ox 



                                        and 



                                        



                                        NIBP



                                        



                                         Location: Lumbar (1-5)



                                        



                                         Lumbar: L3-L4



                                        



                                         Approach: Midline



                                        



                                        Needle and Epidural Catheter: 



                                        



                                         Epidural Kit: SurgiQuest (BD)



                                        



                                         Needle Type: Juvenal



                                        



                                         Number of Attempts: 1



                                        



                                        Test Dose: No test dose



                                        



                                         



                                        



                                         



                                        



                                        Assessment: 



                                        



                                         Sensory Level: Above T10



                                        



                                         



                                        



                                                    



documented in this encounter



Administered Medications







           Medication Order MAR Action Action Date Dose       Rate       Site

 

           acetaminophen ADULT (OFIRMEV) Given      10/14/2020  3:46 PM CDT 1,00

0 mg              



                                        injection



                                                                 



           Administer over 15 Minutes,                                          

   



           ONCE INTRA PROCEDURE, Starting                                       

      



           Wed 10/14/20 at 1546, Until Wed                                      

       



           10/14/20 at 1610, Routine,                                           

  



           Intra-op                                               









                                                    









                                        ceFAZolin (ANCEF) injection



             Given        10/14/2020  2:42 PM CDT 2 g                       



           ONCE INTRA PROCEDURE, Starting Wed 10/14/20 at                       

                      



           1442, Until Wed 10/14/20 at 1610, ASAP,                              

               



           Intra-op                                               









                                                    









                                        ePHEDrine 25 mg/5 mL (5 mg/mL) syringe



             Given        10/14/2020  3:28 PM CDT 10 mg                     



           ONCE INTRA PROCEDURE, Starting Wed 10/14/20                          

                   



           at 1455, Until Wed 10/14/20 at 1610, Routine,                        

                     



           Intra-op                                               









             Given        10/14/2020  2:55 PM CDT 10 mg                     









                                                    









                                        FENTanyl PF (SUBLIMAZE (PF)) injection



             Given        10/14/2020  2:46 PM CDT 25 mcg                    



           Intravenous, ONCE INTRA PROCEDURE, Starting                          

                   



           Wed 10/14/20 at 1446, Until Wed 10/14/20 at                          

                   



           1610, Routine, Intra-op                                             









                                                    









                                        ketorolac (TORADOL) injection



             Given        10/14/2020  3:47 PM CDT 30 mg                     



           ONCE INTRA PROCEDURE, Starting Wed 10/14/20                          

                   



           at 1547, Until Wed 10/14/20 at 1610, Routine,                        

                     



           Intra-op                                               









                                                    









                                        lactated ringers IV infusion



             New Bag      10/14/2020  3:25 PM CDT                           



           IV Infusion, CONTINUOUS PRN, Starting Wed                            

                 



           10/14/20 at 1438, Until Wed 10/14/20 at 1610,                        

                     



           Routine, Intra-op                                             









             New Bag      10/14/2020  2:38 PM CDT                           









                                                    









                                        midazolam (VERSED) injection



             Given        10/14/2020  2:42 PM CDT 2 mg                      



           IV Push, ONCE INTRA PROCEDURE, Starting Wed                          

                   



           10/14/20 at 1442, Until Wed 10/14/20 at 1610,                        

                     



           Routine, Intra-op                                             









                                                    









                                        morpHINE PF (DURAMORPH-PF) injection



             Given        10/14/2020  2:35 PM CDT 0.2 mg                    



           ONCE INTRA PROCEDURE, Starting Wed 10/14/20                          

                   



           at 1435, Until Thu 10/22/20 at 1136,                                 

            



           Routine, Intra-op                                             









                                                    









                                        propofoL IV infusion



             Rate Change  10/14/2020  3:25 PM 75 mcg/kg/min 40.82 mL/hr  



           IV Infusion, CONTINUOUS            CDT                              



           PRN, Starting Wed                                             



           10/14/20 at 1445, Until                                             



           Wed 10/14/20 at 1610,                                             



           Routine, Intra-op                                             









             New Bag      10/14/2020  2:45 PM CDT 50 mcg/kg/min 27.21 mL/hr  









                                                    



documented in this encounter



Advance Directives







                Name            Relationship    Healthcare Agent Communication



                                                Relationship    

 

                Asa Pan Spouse          Health Care Agent [phone]

[phone]



                                                                (Mobile)[phone]

[phone]



                                                                (Home)[email or web address]

## 2020-12-02 NOTE — ER
Nurse's Notes                                                                                     

 [facility]                                                                 

Name: Moriah Pan                                                                               

Age: 55 yrs                                                                                       

Sex: Female                                                                                       

: 1965                                                                                   

MRN: [id number]                                                                                   

Arrival Date: 2020                                                                          

Time: 18:51                                                                                       

Account#: [id number]                                                                            

Bed 26                                                                                            

Private MD:                                                                                       

Diagnosis: Epigastric pain                                                                        

                                                                                                  

Presentation:                                                                                     

                                                                                             

19:04 Chief complaint: Patient states: Around noon felt pain near epigastric area. Took 3     vg1 

      tums and pain did not go away. Then started having upper back pain between the              

      shoulders. Coronavirus screen: Client denies travel out of the [place] in the last 14          

      days. Ebola Screen: Patient negative for fever greater than or equal to 101.5 degrees       

      Fahrenheit, and additional compatible Ebola Virus Disease symptoms. Initial Sepsis          

      Screen: Does the patient meet any 2 criteria? No. Patient's initial sepsis screen is        

      negative. Does the patient have a suspected source of infection? No. Patient's initial      

      sepsis screen is negative. Risk Assessment: Do you want to hurt yourself or someone         

      else? Patient reports no desire to harm self or others. Onset of symptoms was 2020.                                                                                   

19:04 Method Of Arrival: Ambulatory                                                           vg1 

19:04 Acuity: TEA 3                                                                           vg1 

                                                                                                  

Triage Assessment:                                                                                

19:10 General: Appears in no apparent distress. Behavior is calm, cooperative. Pain:          vg1 

      Complains of pain in epigastric area Pain radiates to upper back Pain currently is 9        

      out of 10 on a pain scale. Noted to be guarding. Neuro: Level of Consciousness is           

      awake, alert, obeys commands, Oriented to person, place, time, situation. Respiratory:      

      Airway is patent Respiratory effort is even, unlabored.                                     

                                                                                                  

Historical:                                                                                       

- Allergies:                                                                                      

19:08 No Known Allergies;                                                                     vg1 

- PMHx:                                                                                           

19:08 Hepatitis; resolved ; Hypertension; ITP;                                            vg1 

- PSHx:                                                                                           

19:08 Left knee; Tubal ligation; Spleenectomy;                                                vg1 

                                                                                                  

- Immunization history:: Adult Immunizations up to date, Flu vaccine is not up to date.           

- Social history:: Smoking status: Patient denies any tobacco usage or history of.                

  Patient/guardian denies using alcohol, street drugs, The patient lives with family.             

- Family history:: not pertinent.                                                                 

                                                                                                  

                                                                                                  

Screenin:30 Abuse screen: Denies threats or abuse. Denies injuries from another. Nutritional        aj1 

      screening: No deficits noted. Tuberculosis screening: No symptoms or risk factors           

      identified.                                                                                 

22:35 Fall Risk None identified.                                                              aj1 

                                                                                                  

Assessment:                                                                                       

19:30 General: Appears in no apparent distress. uncomfortable, Behavior is calm, cooperative, aj1 

      appropriate for age. Pain: Complains of pain in epigastric area Pain radiates to back       

      Pain currently is 8 out of 10 on a pain scale. Quality of pain is described as sharp.       

      Neuro: Level of Consciousness is awake, alert, obeys commands, Oriented to person,          

      place, time, situation. Cardiovascular: Patient's skin is warm and dry. Respiratory:        

      Airway is patent Respiratory effort is even, unlabored, Respiratory pattern is regular,     

      symmetrical. GI: Abdomen is non-distended, Bowel sounds present X 4 quads. Abd is soft      

      X 4 quads Abdomen is tender to palpation in epigastric area Reports nausea, Patient         

      currently denies diarrhea, vomiting. : No signs and/or symptoms were reported             

      regarding the genitourinary system. EENT: No signs and/or symptoms were reported            

      regarding the EENT system. Derm: No signs and/or symptoms reported regarding the            

      dermatologic system. Skin is pink, warm \T\ dry. normal. Musculoskeletal: No signs and/or   

      symptoms reported regarding the musculoskeletal system. Circulation, motion, and            

      sensation intact.                                                                           

20:28 Reassessment: Patient appears in no apparent distress at this time. No changes from     aj1 

      previously documented assessment. Patient and/or family updated on plan of care and         

      expected duration. Pain level reassessed. Patient is alert, oriented x 3, equal             

      unlabored respirations, skin warm/dry/pink. Patient transported to CT via stretcher.        

21:38 Reassessment: Patient appears in no apparent distress at this time. No changes from     aj1 

      previously documented assessment. Patient and/or family updated on plan of care and         

      expected duration. Pain level reassessed. Patient states feeling better. Patient states     

      symptoms have improved.                                                                     

22:34 Reassessment: Patient appears in no apparent distress at this time. No changes from     aj1 

      previously documented assessment. Patient and/or family updated on plan of care and         

      expected duration. Pain level reassessed. Patient is alert, oriented x 3, equal             

      unlabored respirations, skin warm/dry/pink.                                                 

                                                                                                  

Vital Signs:                                                                                      

19:04  / 80; Pulse 76; Resp 18; Temp 98.0; Pulse Ox 99% on R/A; Weight 90.72 kg; Height vg1 

      5 ft. 2 in. (157.48 cm); Pain 9/10;                                                         

20:28  / 74; Pulse 62; Resp 18; Pulse Ox 97% on R/A;                                    aj1 

21:40  / 78; Pulse 64; Resp 20; Pulse Ox 95% on R/A;                                    aj1 

22:34  / 76; Pulse 68; Resp 18; Pulse Ox 99% on R/A;                                    aj1 

19:04 Body Mass Index 36.58 (90.72 kg, 157.48 cm)                                             1 

                                                                                                  

ED Course:                                                                                        

18:51 Patient arrived in ED.                                                                  as  

19:06 Triage completed.                                                                       vg1 

19:09 Arm band placed on.                                                                     vg1 

19:30 Patient has correct armband on for positive identification. Bed in low position. Call   aj1 

      light in reach. Side rails up X 1.                                                          

19:30 No provider procedures requiring assistance completed.                                  aj1 

19:47 Flores Armenta MD is Attending Physician.                                           ma2 

20:01 Inserted saline lock: 20 gauge in right antecubital area, using aseptic technique.      1 

      Blood collected.                                                                            

20:23 Kaylin Lewis, RN is Primary Nurse.                                                   aj1 

22:34 IV discontinued, intact, bleeding controlled, No redness/swelling at site. Pressure     aj1 

      dressing applied.                                                                           

                                                                                                  

Administered Medications:                                                                         

No medications were administered                                                                  

                                                                                                  

                                                                                                  

Outcome:                                                                                          

22:10 Discharge ordered by MD.                                                                ma2 

22:35 Discharged to home ambulatory, with family.                                             aj1 

22:35 Condition: good                                                                             

22:35 Discharge instructions given to patient, family, Instructed on discharge instructions,      

      follow up and referral plans. medication usage, Demonstrated understanding of               

      instructions, follow-up care, medications, Prescriptions given X 3.                         

22:35 Patient left the ED.                                                                    aj1 

                                                                                                  

Signatures:                                                                                       

Kaylin Lewis, RN                     RN   jacob1                                                  

Miracle Herrera                             as                                                   

Flores Armenta MD MD ma2                                                  

Ximena Radford RN RN vg1                                                  

Annette Dong RN                       RN   1                                                  

                                                                                                  

**************************************************************************************************

## 2020-12-02 NOTE — EDPHYS
Physician Documentation                                                                           

 [facility]                                                                 

Name: Moriah Pan                                                                               

Age: 55 yrs                                                                                       

Sex: Female                                                                                       

: 1965                                                                                   

MRN: [id number]                                                                                   

Arrival Date: 2020                                                                          

Time: 18:51                                                                                       

Account#: [id number]                                                                            

Bed 26                                                                                            

Private MD:                                                                                       

ED Physician Flores Armenta                                                                    

HPI:                                                                                              

                                                                                             

20:12 This 55 yrs old  Female presents to ER via Ambulatory with complaints of       ma2 

      Epigastric Pain, Back Pain.                                                                 

20:12 The patient presents with pain that is acute. Onset: The symptoms/episode               ma2 

      began/occurred gradually, 1 hour(s) ago. Associated signs and symptoms: Pertinent           

      negatives: constipation, fever, hematuria, incontinence, numbness. Severity of              

      symptoms: At their worst the symptoms were moderate, in the emergency department the        

      symptoms are unchanged. The patient has not experienced similar symptoms in the past.       

      mild epigastric and midback pain for 1 day, constant mild, has had chronic gastritis, .     

                                                                                                  

Historical:                                                                                       

- Allergies:                                                                                      

19:08 No Known Allergies;                                                                     vg1 

- PMHx:                                                                                           

19:08 Hepatitis; resolved ; Hypertension; ITP;                                            vg1 

- PSHx:                                                                                           

19:08 Left knee; Tubal ligation; Spleenectomy;                                                vg1 

                                                                                                  

- Immunization history:: Adult Immunizations up to date, Flu vaccine is not up to date.           

- Social history:: Smoking status: Patient denies any tobacco usage or history of.                

  Patient/guardian denies using alcohol, street drugs, The patient lives with family.             

- Family history:: not pertinent.                                                                 

                                                                                                  

                                                                                                  

ROS:                                                                                              

20:12 Constitutional: Negative for fever, chills, and weight loss.                            ma2 

20:12 All other systems are negative.                                                             

                                                                                                  

Exam:                                                                                             

20:12 Constitutional:  This is a well developed, well nourished patient who is awake, alert,  ma2 

      and in no acute distress. Head/Face:  Normocephalic, atraumatic. Eyes:  Pupils equal        

      round and reactive to light, extra-ocular motions intact.  Lids and lashes normal.          

      Conjunctiva and sclera are non-icteric and not injected.  Cornea within normal limits.      

      Periorbital areas with no swelling, redness, or edema. ENT:  Nares patent. No nasal         

      discharge, no septal abnormalities noted.  Tympanic membranes are normal and external       

      auditory canals are clear.  Oropharynx with no redness, swelling, or masses, exudates,      

      or evidence of obstruction, uvula midline.  Mucous membranes moist. Neck:  Trachea          

      midline, no thyromegaly or masses palpated, and no cervical lymphadenopathy.  Supple,       

      full range of motion without nuchal rigidity, or vertebral point tenderness.  No            

      Meningismus. Chest/axilla:  Normal chest wall appearance and motion.  Nontender with no     

      deformity.  No lesions are appreciated. Cardiovascular:  Regular rate and rhythm with a     

      normal S1 and S2.  No gallops, murmurs, or rubs.  Normal PMI, no JVD.  No pulse             

      deficits. Respiratory:  Lungs have equal breath sounds bilaterally, clear to                

      auscultation and percussion.  No rales, rhonchi or wheezes noted.  No increased work of     

      breathing, no retractions or nasal flaring. Abdomen/GI:  mild epigastric abdominal          

      pain, Soft, non-tender, with normal bowel sounds.  No distension or tympany.  No            

      guarding or rebound.  No evidence of tenderness throughout. Back:  ttp over right           

      paraspinal muscle at t1-t4, No spinal tenderness.  No costovertebral tenderness.  Full      

      range of motion. Skin:  Warm, dry with normal turgor.  Normal color with no rashes, no      

      lesions, and no evidence of cellulitis. MS/ Extremity:  Pulses equal, no cyanosis.          

      Neurovascular intact.  Full, normal range of motion. Neuro:  Awake and alert, GCS 15,       

      oriented to person, place, time, and situation.  Cranial nerves II-XII grossly intact.      

      Motor strength 5/5 in all extremities.  Sensory grossly intact.  Cerebellar exam            

      normal.  Normal gait.                                                                       

                                                                                                  

Vital Signs:                                                                                      

19:04  / 80; Pulse 76; Resp 18; Temp 98.0; Pulse Ox 99% on R/A; Weight 90.72 kg; Height vg1 

      5 ft. 2 in. (157.48 cm); Pain 9/10;                                                         

20:28  / 74; Pulse 62; Resp 18; Pulse Ox 97% on R/A;                                    aj1 

21:40  / 78; Pulse 64; Resp 20; Pulse Ox 95% on R/A;                                    aj1 

22:34  / 76; Pulse 68; Resp 18; Pulse Ox 99% on R/A;                                    aj1 

19:04 Body Mass Index 36.58 (90.72 kg, 157.48 cm)                                             vg1 

                                                                                                  

MDM:                                                                                              

19:47 Patient medically screened.                                                             ma2 

20:12 Differential diagnosis: Peptic Ulcer sprain.                                            ma2 

22:09 Data reviewed: vital signs, nurses notes. Counseling: I had a detailed discussion with  ma2 

      the patient and/or guardian regarding: the historical points, exam findings, and any        

      diagnostic results supporting the discharge/admit diagnosis, the presence of at least       

      one elevated blood pressure reading (>120/80) during this emergency department visit,       

      the need for outpatient follow up. Response to treatment: the patient's symptoms have       

      markedly improved after treatment.                                                          

                                                                                                  

                                                                                             

20:11 Order name: EKG - Nurse/Tech; Complete Time: 21:20                                      ma2 

                                                                                             

20:20 Order name: Urine Dipstick-Ancillary (obtain specimen); Complete Time: 21:20            [facility] 

                                                                                                  

Administered Medications:                                                                         

No medications were administered                                                                  

                                                                                                  

                                                                                                  

Disposition:                                                                                      

20 22:10 Discharged to Home. Impression: Epigastric pain.                                   

- Condition is Stable.                                                                            

- Discharge Instructions: Abdominal Pain, Adult.                                                  

- Prescriptions for Zofran 4 mg Oral Tablet - take 1 tablet by ORAL route every 12                

  hours As needed; 20 tablet. Diclofenac Sodium 75 mg Oral Tablet Sustained Release -             

  take 1 tablet by ORAL route 2 times per day; 30 tablet. Pepcid 20 mg Oral Tablet -              

  take 1 tablet by ORAL route once daily for 10 days; 10 tablet.                                  

- Medication Reconciliation Form, Thank You Letter, Antibiotic Education, Prescription            

  Opioid Use form.                                                                                

- Follow up: Private Physician; When: Tomorrow; Reason: If symptoms return, Continuance           

  of care.                                                                                        

- Notes: see your primary care doctor to get abdominal ultrasound                                 

                                                                                                  

                                                                                                  

Signatures:                                                                                       

Kaylin Lewis RN                     RN   aj1                                                  

Flores Armenta MD MD   ma2                                                  

Celeste Haro                            mw2                                                  

Ximena Radford RN                    RN   vg1                                                  

                                                                                                  

Corrections: (The following items were deleted from the chart)                                    

22:35 22:10 2020 22:10 Discharged to Home. Impression: Epigastric pain. Condition is    aj1 

      Stable. Forms are Medication Reconciliation Form, Thank You Letter, Antibiotic              

      Education, Prescription Opioid Use. Follow up: Private Physician; When: Tomorrow;           

      Reason: If symptoms return, Continuance of care. ma2                                        

                                                                                                  

**************************************************************************************************

## 2020-12-18 ENCOUNTER — HOSPITAL ENCOUNTER (OUTPATIENT)
Dept: HOSPITAL 97 - OR | Age: 55
LOS: 1 days | Discharge: HOME | End: 2020-12-19
Attending: SURGERY
Payer: COMMERCIAL

## 2020-12-18 VITALS — BODY MASS INDEX: 36.6 KG/M2

## 2020-12-18 DIAGNOSIS — Z20.828: ICD-10-CM

## 2020-12-18 DIAGNOSIS — K80.12: Primary | ICD-10-CM

## 2020-12-18 DIAGNOSIS — I10: ICD-10-CM

## 2020-12-18 PROCEDURE — BF101ZZ FLUOROSCOPY OF BILE DUCTS USING LOW OSMOLAR CONTRAST: ICD-10-PCS

## 2020-12-18 PROCEDURE — 0FT44ZZ RESECTION OF GALLBLADDER, PERCUTANEOUS ENDOSCOPIC APPROACH: ICD-10-PCS

## 2020-12-18 PROCEDURE — 88304 TISSUE EXAM BY PATHOLOGIST: CPT

## 2020-12-18 PROCEDURE — 94010 BREATHING CAPACITY TEST: CPT

## 2020-12-18 PROCEDURE — 74300 X-RAY BILE DUCTS/PANCREAS: CPT

## 2020-12-18 RX ADMIN — CEFOXITIN SODIUM SCH MLS: 1 INJECTION, SOLUTION INTRAVENOUS at 17:52

## 2020-12-18 RX ADMIN — SODIUM CHLORIDE, SODIUM LACTATE, POTASSIUM CHLORIDE, AND CALCIUM CHLORIDE SCH MLS: .6; .31; .03; .02 INJECTION, SOLUTION INTRAVENOUS at 17:52

## 2020-12-18 RX ADMIN — SODIUM CHLORIDE, SODIUM LACTATE, POTASSIUM CHLORIDE, AND CALCIUM CHLORIDE ONE MLS: .6; .31; .03; .02 INJECTION, SOLUTION INTRAVENOUS at 12:00

## 2020-12-18 RX ADMIN — SODIUM CHLORIDE, SODIUM LACTATE, POTASSIUM CHLORIDE, AND CALCIUM CHLORIDE ONE MLS: .6; .31; .03; .02 INJECTION, SOLUTION INTRAVENOUS at 12:41

## 2020-12-18 NOTE — XMS REPORT
Continuity of Care Document

                          Created on:2020



Patient:CAS PAN

Sex:Female

:1965

External Reference #:[id number]





Demographics







                          Address                   [address] [place] [address]

 

                          Home Phone                [phone]

 

                          Work Phone                [phone]

 

                          Mobile Phone              [phone]

 

                          Email Address             [email or web address]

 

                          Preferred Language        English

 

                          Marital Status            Unknown

 

                          Amish Affiliation     Unknown

 

                          Race                      Unknown

 

                          Additional Race(s)        Unavailable



                                                    White

 

                          Ethnic Group              Unknown









Author







                          Organization              [facility]

t

 

                          Address                   [address]



                                                    [place] [address]

 

                          Phone                     [phone]









Support







                Name            Relationship    Address         Phone

 

                Primitivo Pan  Spouse          [address]  [phone]



                                                [place] [address] 









Care Team Providers







                    Name                Role                Phone

 

                    Roni Ortega MD Attending Clinician [phone]

 

                    Kareem BOURNE          Attending Clinician [phone]

 

                    Reinaldo YANEZ          Attending Clinician [phone]

 

                    Pob,  Lab Main      Attending Clinician Unavailable

 

                    Only,  Test         Attending Clinician Unavailable

 

                    Roni Ortega MD Admitting Clinician [phone]









Problems

This patient has no known problems.



Allergies, Adverse Reactions, Alerts

This patient has no known allergies or adverse reactions.



Medications

This patient has no known medications.



Procedures

This patient has no known procedures.



Encounters







        Start   End     Encounter Admission Attending Care    Care    Encounter 

Source



        Date/Time Date/Time Type    Type    Clinicians Facility Department ID   

   

 

        2020-10-14 2020-10-15 [facility]    [id number]

[id number] 



        07:46:00 14:39:00 Encounter         Stef Corea [id number]    

     



                                                [place] [id number]         



                                                [place]  [phone]         



                                                        081             

 

        2020-10-14 2020-10-14 Anesthesia         Agustín Serna [facility]    [id number]

[id number] 



        14:38:00 16:07:00                 Ellen Najera 350.1.13.10  

       



                                                [place] [id number]         



                                                Surgical [phone]         



                                                [place]  [address]             

 

        2020-10-13 2020-10-13 Technician         Pob, [facility]    [id number]

[id number] 



        12:31:27 12:46:27 Visit           Lab Main [place] 350.1.13.10         



                                                [place] [id number]         



                                                [facility] [phone]         



                                                [facility]     353             



                                                [facility]                 

 

        2020-10-13 2020-10-13 Laboratory         Only, [facility]    [id number]

[id number] 



        12:30:53 12:45:53 Only            Test    [place] 350.1.13.10         



                                                [place] [id number]         



                                                [place]  [phone]         



                                                        353             







Results

This patient has no known results.

## 2020-12-18 NOTE — RAD REPORT
EXAM DESCRIPTION:  RADCholangiogram Oper-Xray Or12/18/2020 3:05 pm

 

CLINICAL HISTORY:   Abdominal pain

 

FINDINGS:   The examination was performed by Dr. Dale. The cystic duct was cannulated and contrast
 administered.

 

Contrast flowed into the duodenum. Mild dilatation of the common bile duct.

 

Six fluoroscopic spot images obtained. Fluoroscopy time 0.3 minutes

## 2020-12-18 NOTE — P.OP
Preoperative diagnosis: Chronic cholecystitis with cholelithiasis, biliary colic


Postoperative diagnosis: The same


Primary procedure: Laparoscopic cholecystectomy


Secondary procedure: Cholangiogram


Other procedure(s): Deacon block


Anesthesia: General


Estimated blood loss: Less than 20 cc


Specimen: 1 gallbladder and contents


Findings: Chronically distended inflamed gallbladder


Operative Technique: 





The patient was brought to the operating room and placed supine on the table. 

After the induction of adequate general endotracheal anesthesia, there the 

abdomen was prepped with a DuraPrep solution, and she was draped in usual 

aseptic manner. A subumbilical incision was made. This brought down through the 

skin and subcutaneous tissue. The Visiport was enter the peritoneal cavity and 

created pneumoperitoneum to approximately 12 mm of mercury.  Under direct vision

a 5 mm trocar was placed in the upper midline, and 2 other 5 mm trocars on the 

right lateral side of the abdomen.





With the patient placed in reverse Trendelenburg and rolled to the left were 

able to visualize the right upper quadrant. We could see that there was a acute 

on top of a chronic inflammatory process.  The omentum was markedly adherent to 

the gallbladder.  Gentle dissection lattice to develop a plane between the 

gallbladder and the omentum.  This was quite adherent both medially and 

laterally on the liver edge of the gallbladder.  This omentum was finally peel 

down to expose Cristi's pouch. The gallbladder was aspirated at this time.  We

had thick yellow mucoid bile inside the gallbladder itself.  Gentle dissection 

was done now to expose the cystic duct and artery.  Having obtained the critical

view, a clip was placed between the gallbladder and the cystic duct. An opening 

was made into the cystic duct through which we obtained a cholangiogram.  On 

pressure injection we really demonstrate the cystic duct. We could also see the 

common bile duct with flow contrast into the duodenum. We did not see any 

filling defects.  Pressure injection did not allow us to fill the upper 

radicals.  The catheter was now removed. Clips were placed on the distal portion

of the cystic duct to secure it. The gallbladder was now gently dissected off of

the liver bed.  We did make a rent in the gallbladder spilling this large stone 

into the peritoneal cavity. The stone was retrieved and set aside.  The 

gallbladder was then dissected free in and placed into an Endo-Catch.  The stone

was grasped and gently placed into the Endo pouch as well. The total specimen 

was now withdrawn from the peritoneal cavity. At this point the area was 

irrigated with a copious amount of saline solution. Electro cautery was used to 

ensure adequate hemostasis. At this point the irrigating fluid was aspirated 

from the peritoneal cavity. Attention was turned back towards the emboli kiss 

with a 5 mm camera in the upper midline. 2 absorbable sutures were placed at the

emboli kiss to approximate the facile defect. The irrigating fluid was now again

aspirated from the peritoneal cavity. Attention was turned towards the abdominal

wall. A Deacon block with 0.25% Marcaine approximately 8 cc in volume was used to 

Guadalupe is size the anterior abdominal wall. At this point the pneumoperitoneum 

was collapsed, the trocars withdrawn, and staples applied to the skin.





At the end of the procedure she was stable when sent to the recovery room. 

Needle sponge instrument count were correct. No drains were placed.


Transferred to: Recovery Room


Condition: Good

## 2020-12-18 NOTE — P.HP
Date of Service: 12/18/20





PC:  This 55-year-old female presents for elective laparoscopic cholecystectomy 

with intraoperative cholangiogram.





HPC:  Patient has been experiencing right upper quadrant abdominal pain, 

radiating into her back, for the last few months.  Recently had a very severe 

episode requiring going to the emergency room. She was found have cholecystitis 

with cholelithiasis.  Her pain resolves it ER she was discharged.  I saw her in 

she presents now for definitive procedure.





PMH:  ITP, hepatitis





PSHx:  Previous splenectomy (age 5)





SOC:  No known allergens





SYS REVIEW:  No cough, wheeze, or shortness of breath.  No chest pain or 

palpitations.  No urinary complaints.  Good exercise tolerance.  However has not

felt well over the last few weeks





O/E awake alert vital signs are stable








HEENT:  Nonicteric





Chest:  Air entry equal bilateral





ABD:  Soft nontender





LOCO:  Intact





DATA:  Documented gallstones





IMPRESSION:  Chronic Cholecystitis with cholelithiasis





PLAN:  I will to the operating room for laparoscopic possible open 

cholecystectomy with cholangiogram.  The risks of this procedure have been 

discussed.  The possibility of bleeding, infection, injury to bowel blood 

vessels intestines has been described.  The possible need for an open and/or 

further surgeries and procedures was discussed.  Liver biopsies, ERCPs, another 

follow-up were outlined.  She understands and wants to proceed.

## 2020-12-19 VITALS — TEMPERATURE: 97.9 F | DIASTOLIC BLOOD PRESSURE: 67 MMHG | SYSTOLIC BLOOD PRESSURE: 184 MMHG

## 2020-12-19 VITALS — OXYGEN SATURATION: 96 %

## 2020-12-19 RX ADMIN — HYDROCODONE BITARTRATE AND ACETAMINOPHEN PRN TAB: 7.5; 325 TABLET ORAL at 10:00

## 2020-12-19 RX ADMIN — CEFOXITIN SODIUM SCH MLS: 1 INJECTION, SOLUTION INTRAVENOUS at 05:30

## 2020-12-19 RX ADMIN — SODIUM CHLORIDE, SODIUM LACTATE, POTASSIUM CHLORIDE, AND CALCIUM CHLORIDE SCH MLS: .6; .31; .03; .02 INJECTION, SOLUTION INTRAVENOUS at 03:52

## 2020-12-19 RX ADMIN — HYDROCODONE BITARTRATE AND ACETAMINOPHEN PRN TAB: 7.5; 325 TABLET ORAL at 00:06

## 2020-12-19 RX ADMIN — CEFOXITIN SODIUM SCH MLS: 1 INJECTION, SOLUTION INTRAVENOUS at 00:05
